# Patient Record
Sex: FEMALE | Race: WHITE | NOT HISPANIC OR LATINO | Employment: UNEMPLOYED | URBAN - NONMETROPOLITAN AREA
[De-identification: names, ages, dates, MRNs, and addresses within clinical notes are randomized per-mention and may not be internally consistent; named-entity substitution may affect disease eponyms.]

---

## 2018-12-06 ENCOUNTER — HISTORICAL (OUTPATIENT)
Dept: ADMINISTRATIVE | Facility: HOSPITAL | Age: 33
End: 2018-12-06

## 2020-10-07 ENCOUNTER — HISTORICAL (OUTPATIENT)
Dept: ADMINISTRATIVE | Facility: HOSPITAL | Age: 35
End: 2020-10-07

## 2022-01-04 LAB
C TRACH DNA SPEC QL NAA+PROBE: NEGATIVE
HUMAN PAPILLOMAVIRUS (HPV): NORMAL
N GONNORRH DNA PROBE W/RFLX: NEGATIVE

## 2022-03-11 LAB
AGE: 36
ALBUMIN SERPL-MCNC: 3.9 G/DL (ref 3.4–5)
ALBUMIN/GLOB SERPL: 1.4 {RATIO}
ALP SERPL-CCNC: 114 U/L (ref 50–144)
ALT SERPL-CCNC: 27 U/L (ref 1–45)
ANION GAP SERPL CALC-SCNC: 5 MMOL/L (ref 2–13)
AST SERPL-CCNC: 25 U/L (ref 14–36)
BASOPHILS # BLD AUTO: 0.02 10*3/UL (ref 0.01–0.08)
BASOPHILS NFR BLD AUTO: 0.3 % (ref 0.1–1.2)
BILIRUB SERPL-MCNC: 0.39 MG/DL (ref 0–1)
BUN SERPL-MCNC: 15 MG/DL (ref 7–20)
CALCIUM SERPL-MCNC: 8.4 MG/DL (ref 8.4–10.2)
CHLORIDE SERPL-SCNC: 113 MMOL/L (ref 94–110)
CO2 SERPL-SCNC: 22 MMOL/L (ref 21–32)
CREAT SERPL-MCNC: 0.74 MG/DL (ref 0.52–1.04)
CREAT/UREA NIT SERPL: 20.3 (ref 12–20)
DEPRECATED CALCIDIOL+CALCIFEROL SERPL-MC: 26.6 NG/ML (ref 30–100)
EOSINOPHIL # BLD AUTO: 0.08 10*3/UL (ref 0.04–0.36)
EOSINOPHIL NFR BLD AUTO: 1.2 % (ref 0.7–7)
ERYTHROCYTE [DISTWIDTH] IN BLOOD BY AUTOMATED COUNT: 14.3 % (ref 11–14.5)
GLOBULIN SER-MCNC: 2.7 G/DL (ref 2–3.9)
GLUCOSE SERPL-MCNC: 90 MG/DL (ref 70–115)
HCT VFR BLD AUTO: 37.9 % (ref 36–48)
HGB BLD-MCNC: 12.2 G/DL (ref 11.8–16)
IMM GRANULOCYTES # BLD AUTO: 0.05 10*3/UL (ref 0–0.03)
IMM GRANULOCYTES NFR BLD AUTO: 0.7 % (ref 0–0.5)
LYMPHOCYTES # BLD AUTO: 2.08 10*3/UL (ref 1.16–3.74)
LYMPHOCYTES NFR BLD AUTO: 30.1 % (ref 20–55)
MANUAL DIFF? (OHS): NORMAL
MCH RBC QN AUTO: 28.7 PG (ref 27–34)
MCHC RBC AUTO-ENTMCNC: 32.2 G/DL (ref 31–37)
MCV RBC AUTO: 89.2 FL (ref 79–99)
MONOCYTES # BLD AUTO: 0.68 10*3/UL (ref 0.24–0.36)
MONOCYTES NFR BLD AUTO: 9.9 % (ref 4.7–12.5)
NEUTROPHILS # BLD AUTO: 3.99 10*3/UL (ref 1.56–6.13)
NEUTROPHILS NFR BLD AUTO: 57.8 % (ref 37–73)
PLATELET # BLD AUTO: 312 10*3/UL (ref 140–371)
PMV BLD AUTO: 9.5 FL (ref 9.4–12.4)
POTASSIUM SERPL-SCNC: 4.1 MMOL/L (ref 3.5–5.1)
PROT SERPL-MCNC: 6.6 G/DL (ref 6.3–8.2)
RBC # BLD AUTO: 4.25 10*6/UL (ref 4–5.1)
SODIUM SERPL-SCNC: 140 MMOL/L (ref 135–145)
VIT B12 SERPL-MCNC: 353 PG/ML (ref 211–946)
WBC # SPEC AUTO: 6.9 10*3/UL (ref 4–11.5)

## 2022-03-25 LAB
CHOLEST SERPL-MCNC: 135 MG/DL (ref 0–200)
EST. AVERAGE GLUCOSE BLD GHB EST-MCNC: 90 MG/DL (ref 70–115)
HBA1C MFR BLD: 5 % (ref 4–6)
HDLC SERPL-MCNC: 38 MG/DL (ref 40–60)
LDLC SERPL CALC-MCNC: 61.5 MG/DL (ref 30–100)
TRIGL SERPL-MCNC: 130 MG/DL (ref 30–200)

## 2022-04-07 ENCOUNTER — HISTORICAL (OUTPATIENT)
Dept: ADMINISTRATIVE | Facility: HOSPITAL | Age: 37
End: 2022-04-07

## 2022-04-24 VITALS
WEIGHT: 251.31 LBS | HEIGHT: 55 IN | BODY MASS INDEX: 58.16 KG/M2 | DIASTOLIC BLOOD PRESSURE: 89 MMHG | OXYGEN SATURATION: 100 % | SYSTOLIC BLOOD PRESSURE: 120 MMHG

## 2022-05-15 ENCOUNTER — HISTORICAL (OUTPATIENT)
Dept: ADMINISTRATIVE | Facility: HOSPITAL | Age: 37
End: 2022-05-15

## 2023-01-23 ENCOUNTER — LAB VISIT (OUTPATIENT)
Dept: LAB | Facility: HOSPITAL | Age: 38
End: 2023-01-23
Attending: PSYCHIATRY & NEUROLOGY
Payer: MEDICAID

## 2023-01-23 DIAGNOSIS — Z51.81 ENCOUNTER FOR THERAPEUTIC DRUG MONITORING: Primary | ICD-10-CM

## 2023-01-23 LAB — PHENYTOIN SERPL-MCNC: 16.4 UG/ML (ref 10–20)

## 2023-01-23 PROCEDURE — 80175 DRUG SCREEN QUAN LAMOTRIGINE: CPT

## 2023-01-23 PROCEDURE — 80185 ASSAY OF PHENYTOIN TOTAL: CPT

## 2023-01-23 PROCEDURE — 36415 COLL VENOUS BLD VENIPUNCTURE: CPT

## 2023-01-24 LAB — LAMOTRIGINE SERPL-MCNC: 1.7 MCG/ML (ref 3–15)

## 2023-01-25 ENCOUNTER — DOCUMENTATION ONLY (OUTPATIENT)
Dept: ADMINISTRATIVE | Facility: HOSPITAL | Age: 38
End: 2023-01-25
Payer: MEDICAID

## 2023-02-15 ENCOUNTER — TELEPHONE (OUTPATIENT)
Dept: FAMILY MEDICINE | Facility: CLINIC | Age: 38
End: 2023-02-15
Payer: MEDICAID

## 2023-02-15 VITALS
TEMPERATURE: 98 F | HEART RATE: 65 BPM | DIASTOLIC BLOOD PRESSURE: 70 MMHG | WEIGHT: 250.88 LBS | SYSTOLIC BLOOD PRESSURE: 102 MMHG | OXYGEN SATURATION: 99 % | BODY MASS INDEX: 84.86 KG/M2

## 2023-02-15 DIAGNOSIS — R60.9 EDEMA, UNSPECIFIED TYPE: Primary | ICD-10-CM

## 2023-02-15 DIAGNOSIS — E03.9 HYPOTHYROIDISM, UNSPECIFIED TYPE: Primary | ICD-10-CM

## 2023-02-15 DIAGNOSIS — I10 HYPERTENSION, UNSPECIFIED TYPE: ICD-10-CM

## 2023-02-15 RX ORDER — SPIRONOLACTONE 50 MG/1
50 TABLET, FILM COATED ORAL DAILY
Qty: 30 TABLET | Refills: 0 | Status: SHIPPED | OUTPATIENT
Start: 2023-02-15 | End: 2023-02-16

## 2023-02-15 RX ORDER — HYDROXYZINE HYDROCHLORIDE 50 MG/1
50 TABLET, FILM COATED ORAL 4 TIMES DAILY
COMMUNITY
Start: 2022-11-15 | End: 2023-03-02 | Stop reason: SDUPTHER

## 2023-02-15 RX ORDER — LOSARTAN POTASSIUM 50 MG/1
50 TABLET ORAL DAILY
Qty: 30 TABLET | Refills: 0 | Status: SHIPPED | OUTPATIENT
Start: 2023-02-15 | End: 2023-03-16

## 2023-02-15 RX ORDER — LEVOTHYROXINE SODIUM 125 UG/1
125 TABLET ORAL EVERY MORNING
Qty: 30 TABLET | Refills: 0 | Status: SHIPPED | OUTPATIENT
Start: 2023-02-15 | End: 2023-03-16

## 2023-02-15 RX ORDER — PROCHLORPERAZINE MALEATE 10 MG
10 TABLET ORAL 2 TIMES DAILY PRN
COMMUNITY
Start: 2022-11-14

## 2023-02-15 RX ORDER — LAMOTRIGINE 100 MG/1
500 TABLET ORAL DAILY
COMMUNITY
Start: 2023-01-18 | End: 2023-07-13

## 2023-02-15 RX ORDER — PHENYTOIN SODIUM 100 MG/1
500 CAPSULE, EXTENDED RELEASE ORAL DAILY
COMMUNITY
Start: 2023-01-18

## 2023-02-15 RX ORDER — LOSARTAN POTASSIUM 50 MG/1
50 TABLET ORAL DAILY
COMMUNITY
Start: 2023-01-18 | End: 2023-02-15 | Stop reason: SDUPTHER

## 2023-02-15 RX ORDER — LEVOTHYROXINE SODIUM 125 UG/1
125 TABLET ORAL EVERY MORNING
COMMUNITY
Start: 2023-01-18 | End: 2023-02-15 | Stop reason: SDUPTHER

## 2023-02-15 RX ORDER — SPIRONOLACTONE 50 MG/1
50 TABLET, FILM COATED ORAL DAILY
COMMUNITY
Start: 2023-01-18 | End: 2023-02-15 | Stop reason: SDUPTHER

## 2023-02-15 RX ORDER — RIZATRIPTAN BENZOATE 10 MG/1
10 TABLET ORAL DAILY PRN
COMMUNITY
Start: 2022-11-14

## 2023-02-15 RX ORDER — PHENYTOIN 50 MG/1
50 TABLET, CHEWABLE ORAL NIGHTLY
COMMUNITY
Start: 2023-01-18

## 2023-02-15 RX ORDER — ACETAZOLAMIDE 250 MG/1
500 TABLET ORAL 2 TIMES DAILY
COMMUNITY
Start: 2023-01-18

## 2023-02-15 RX ORDER — SIMVASTATIN 20 MG/1
20 TABLET, FILM COATED ORAL NIGHTLY
COMMUNITY
Start: 2022-02-11 | End: 2023-03-02 | Stop reason: ALTCHOICE

## 2023-02-15 RX ORDER — LURASIDONE HYDROCHLORIDE 40 MG/1
40 TABLET, FILM COATED ORAL NIGHTLY
COMMUNITY
Start: 2023-01-18 | End: 2023-03-02 | Stop reason: SDUPTHER

## 2023-02-15 RX ORDER — SERTRALINE HYDROCHLORIDE 100 MG/1
150 TABLET, FILM COATED ORAL DAILY
COMMUNITY
Start: 2023-01-18 | End: 2023-03-02 | Stop reason: SDUPTHER

## 2023-02-16 ENCOUNTER — OFFICE VISIT (OUTPATIENT)
Dept: FAMILY MEDICINE | Facility: CLINIC | Age: 38
End: 2023-02-16
Payer: MEDICAID

## 2023-02-16 VITALS
TEMPERATURE: 99 F | SYSTOLIC BLOOD PRESSURE: 120 MMHG | HEIGHT: 58 IN | BODY MASS INDEX: 54.6 KG/M2 | WEIGHT: 260.13 LBS | OXYGEN SATURATION: 98 % | HEART RATE: 70 BPM | DIASTOLIC BLOOD PRESSURE: 70 MMHG

## 2023-02-16 DIAGNOSIS — E03.9 HYPOTHYROIDISM, UNSPECIFIED TYPE: ICD-10-CM

## 2023-02-16 DIAGNOSIS — G43.909 MIGRAINE WITHOUT STATUS MIGRAINOSUS, NOT INTRACTABLE, UNSPECIFIED MIGRAINE TYPE: Primary | ICD-10-CM

## 2023-02-16 DIAGNOSIS — K21.9 GASTROESOPHAGEAL REFLUX DISEASE, UNSPECIFIED WHETHER ESOPHAGITIS PRESENT: ICD-10-CM

## 2023-02-16 DIAGNOSIS — E78.5 HYPERLIPIDEMIA, UNSPECIFIED HYPERLIPIDEMIA TYPE: ICD-10-CM

## 2023-02-16 DIAGNOSIS — G40.909 NONINTRACTABLE EPILEPSY WITHOUT STATUS EPILEPTICUS, UNSPECIFIED EPILEPSY TYPE: ICD-10-CM

## 2023-02-16 DIAGNOSIS — I10 PRIMARY HYPERTENSION: ICD-10-CM

## 2023-02-16 DIAGNOSIS — E66.01 MORBID OBESITY: ICD-10-CM

## 2023-02-16 PROBLEM — R19.5 LOOSE STOOLS: Status: ACTIVE | Noted: 2023-02-16

## 2023-02-16 PROBLEM — F41.1 GENERALIZED ANXIETY DISORDER: Status: ACTIVE | Noted: 2023-02-16

## 2023-02-16 PROBLEM — F31.9 BIPOLAR I DISORDER: Status: ACTIVE | Noted: 2023-02-16

## 2023-02-16 PROBLEM — K92.1 HEMATOCHEZIA: Status: ACTIVE | Noted: 2023-02-16

## 2023-02-16 PROBLEM — F25.9 SCHIZOAFFECTIVE DISORDER: Status: ACTIVE | Noted: 2023-02-16

## 2023-02-16 PROCEDURE — 3074F SYST BP LT 130 MM HG: CPT | Mod: CPTII,,, | Performed by: NURSE PRACTITIONER

## 2023-02-16 PROCEDURE — 99214 PR OFFICE/OUTPT VISIT, EST, LEVL IV, 30-39 MIN: ICD-10-PCS | Mod: ,,, | Performed by: NURSE PRACTITIONER

## 2023-02-16 PROCEDURE — 1160F RVW MEDS BY RX/DR IN RCRD: CPT | Mod: CPTII,,, | Performed by: NURSE PRACTITIONER

## 2023-02-16 PROCEDURE — 4010F PR ACE/ARB THEARPY RXD/TAKEN: ICD-10-PCS | Mod: CPTII,,, | Performed by: NURSE PRACTITIONER

## 2023-02-16 PROCEDURE — 3008F BODY MASS INDEX DOCD: CPT | Mod: CPTII,,, | Performed by: NURSE PRACTITIONER

## 2023-02-16 PROCEDURE — 3008F PR BODY MASS INDEX (BMI) DOCUMENTED: ICD-10-PCS | Mod: CPTII,,, | Performed by: NURSE PRACTITIONER

## 2023-02-16 PROCEDURE — 1160F PR REVIEW ALL MEDS BY PRESCRIBER/CLIN PHARMACIST DOCUMENTED: ICD-10-PCS | Mod: CPTII,,, | Performed by: NURSE PRACTITIONER

## 2023-02-16 PROCEDURE — 3074F PR MOST RECENT SYSTOLIC BLOOD PRESSURE < 130 MM HG: ICD-10-PCS | Mod: CPTII,,, | Performed by: NURSE PRACTITIONER

## 2023-02-16 PROCEDURE — 1159F MED LIST DOCD IN RCRD: CPT | Mod: CPTII,,, | Performed by: NURSE PRACTITIONER

## 2023-02-16 PROCEDURE — 3078F PR MOST RECENT DIASTOLIC BLOOD PRESSURE < 80 MM HG: ICD-10-PCS | Mod: CPTII,,, | Performed by: NURSE PRACTITIONER

## 2023-02-16 PROCEDURE — 4010F ACE/ARB THERAPY RXD/TAKEN: CPT | Mod: CPTII,,, | Performed by: NURSE PRACTITIONER

## 2023-02-16 PROCEDURE — 3078F DIAST BP <80 MM HG: CPT | Mod: CPTII,,, | Performed by: NURSE PRACTITIONER

## 2023-02-16 PROCEDURE — 1159F PR MEDICATION LIST DOCUMENTED IN MEDICAL RECORD: ICD-10-PCS | Mod: CPTII,,, | Performed by: NURSE PRACTITIONER

## 2023-02-16 PROCEDURE — 99214 OFFICE O/P EST MOD 30 MIN: CPT | Mod: ,,, | Performed by: NURSE PRACTITIONER

## 2023-02-16 RX ORDER — LEVONORGESTREL 52 MG/1
1 INTRAUTERINE DEVICE INTRAUTERINE ONCE
COMMUNITY
Start: 2022-09-13 | End: 2023-02-16 | Stop reason: SDUPTHER

## 2023-02-16 NOTE — PROGRESS NOTES
Patient ID: [id number]     Chief Complaint: Hypertension, Hypothyroidism, and Anxiety (Follow up)      HPI:     Ebony Richard is a 37 y.o. female here today for a routine visit.  At her last office visit on 11/22/2022 she reported that the only antihypertensive she was taking was losartan.  She was encouraged to start this medication in notify the office for any elevations over 120/80.  Today she states that she is still taking the losartan and that she is also taking spironolactone.  She reports having seen her neurologist since her last visit in no medication changes were made though he did order some blood work.  She will follow up with him again soon.  She has no complaints today.      Past Medical History:  has a past medical history of Anxiety, Bipolar disorder, Epilepsy, unspecified, not intractable, with status epilepticus, GERD (gastroesophageal reflux disease), Hematochezia, Hyperlipidemia, Hypertension, Hypothyroidism, Long term use of drug, Loose stools, Migraines, Obesity, unspecified, and Schizoaffective disorder.    Surgical History:  has a past surgical history that includes Intrauterine device insertion (05/25/2022); Cholecystectomy; Tonsillectomy; and esophagesophagostomy.    Family History: family history includes Colon cancer in her maternal aunt; Diabetes type II in her father and mother; Hypertension in her father and mother.    Social History:  reports that she has quit smoking. Her smoking use included cigarettes. She has been exposed to tobacco smoke. She has never used smokeless tobacco. She reports that she does not drink alcohol and does not use drugs.    Current Outpatient Medications   Medication Instructions    acetaZOLAMIDE (DIAMOX) 250 mg, Oral, Daily    hydrOXYzine (ATARAX) 50 mg, Oral, 4 times daily    lamoTRIgine (LAMICTAL) 100 mg, Oral, Daily    LATUDA 40 mg, Oral, Nightly    levonorgestreL (MIRENA) 20 mcg/24 hours (8 yrs) 52 mg IUD 1 each, Intrauterine, Once     "levothyroxine (SYNTHROID) 125 mcg, Oral, Every morning    losartan (COZAAR) 50 mg, Oral, Daily    phenytoin (DILANTIN) 50 mg, Oral, Daily    phenytoin (DILANTIN) 100 mg, Oral, Daily    prochlorperazine (COMPAZINE) 10 mg, Oral, 2 times daily PRN    rizatriptan (MAXALT) 10 mg, Oral, Daily PRN    sertraline (ZOLOFT) 100 mg, Oral, Daily    simvastatin (ZOCOR) 20 mg, Oral, Nightly   Dosages of Dilantin and Lamictal not verified for accuracy.    Patient is allergic to codeine.     Patient Care Team:  MARCO Garcia as PCP - General (Family Medicine)  Benji Alaniz MD (Neurology)  YUAN MeyerP as Nurse Practitioner (Psychiatry)  Brenda Heart NP as Nurse Practitioner (Obstetrics and Gynecology)       Subjective:     Review of Systems    See HPI     Objective:     Visit Vitals  /70 (BP Location: Right arm, Patient Position: Sitting)   Pulse 70   Temp 98.6 °F (37 °C) (Temporal)   Ht 4' 10" (1.473 m)   Wt 118 kg (260 lb 2.3 oz)   LMP 02/02/2023 (Approximate)   SpO2 98%   BMI 54.37 kg/m²       Physical Exam  Constitutional:       Appearance: She is obese.   HENT:      Head: Normocephalic and atraumatic.      Mouth/Throat:      Mouth: Mucous membranes are moist.   Cardiovascular:      Rate and Rhythm: Normal rate and regular rhythm.   Pulmonary:      Effort: Pulmonary effort is normal.      Breath sounds: Normal breath sounds.   Musculoskeletal:      Cervical back: No tenderness.   Lymphadenopathy:      Cervical: No cervical adenopathy.   Skin:     General: Skin is warm and dry.   Neurological:      Mental Status: She is alert and oriented to person, place, and time. Mental status is at baseline.   Psychiatric:         Mood and Affect: Mood normal.         Behavior: Behavior normal.         Thought Content: Thought content normal.         Judgment: Judgment normal.       Assessment:       ICD-10-CM ICD-9-CM   1. Migraine without status migrainosus, not intractable, unspecified " migraine type  G43.909 346.90   2. Nonintractable epilepsy without status epilepticus, unspecified epilepsy type  G40.909 345.90   3. Hyperlipidemia, unspecified hyperlipidemia type  E78.5 272.4   4. Primary hypertension  I10 401.9   5. Hypothyroidism, unspecified type  E03.9 244.9   6. Morbid obesity  E66.01 278.01   7. Gastroesophageal reflux disease, unspecified whether esophagitis present  K21.9 530.81        Plan:     Continue current medications without change   Encouraged to keep all specialist appointments      Follow up in about 3 months (around 5/16/2023) for Wellness, Labs Prior. In addition to their scheduled follow up, the patient has also been instructed to follow up on as needed basis.     Future Appointments   Date Time Provider Department Center   3/2/2023 11:30 AM Saad Combs, PMURIEL MOORE  [facility]   3/7/2023  8:00 AM Brenda Heart NP [facility]   5/16/2023  1:00 PM MARCO Garcia [facility]        MARCO Torres

## 2023-03-02 ENCOUNTER — OFFICE VISIT (OUTPATIENT)
Dept: BEHAVIORAL HEALTH | Facility: CLINIC | Age: 38
End: 2023-03-02
Payer: MEDICAID

## 2023-03-02 VITALS
HEART RATE: 60 BPM | HEIGHT: 58 IN | OXYGEN SATURATION: 99 % | SYSTOLIC BLOOD PRESSURE: 132 MMHG | WEIGHT: 260 LBS | DIASTOLIC BLOOD PRESSURE: 86 MMHG | BODY MASS INDEX: 54.57 KG/M2 | TEMPERATURE: 98 F

## 2023-03-02 DIAGNOSIS — F41.1 GENERALIZED ANXIETY DISORDER: ICD-10-CM

## 2023-03-02 DIAGNOSIS — F31.9 BIPOLAR I DISORDER: Primary | ICD-10-CM

## 2023-03-02 PROCEDURE — 1159F PR MEDICATION LIST DOCUMENTED IN MEDICAL RECORD: ICD-10-PCS | Mod: CPTII,,, | Performed by: NURSE PRACTITIONER

## 2023-03-02 PROCEDURE — 3079F PR MOST RECENT DIASTOLIC BLOOD PRESSURE 80-89 MM HG: ICD-10-PCS | Mod: CPTII,,, | Performed by: NURSE PRACTITIONER

## 2023-03-02 PROCEDURE — 1159F MED LIST DOCD IN RCRD: CPT | Mod: CPTII,,, | Performed by: NURSE PRACTITIONER

## 2023-03-02 PROCEDURE — 1160F PR REVIEW ALL MEDS BY PRESCRIBER/CLIN PHARMACIST DOCUMENTED: ICD-10-PCS | Mod: CPTII,,, | Performed by: NURSE PRACTITIONER

## 2023-03-02 PROCEDURE — 4010F PR ACE/ARB THEARPY RXD/TAKEN: ICD-10-PCS | Mod: CPTII,,, | Performed by: NURSE PRACTITIONER

## 2023-03-02 PROCEDURE — 99214 PR OFFICE/OUTPT VISIT, EST, LEVL IV, 30-39 MIN: ICD-10-PCS | Mod: ,,, | Performed by: NURSE PRACTITIONER

## 2023-03-02 PROCEDURE — 3075F SYST BP GE 130 - 139MM HG: CPT | Mod: CPTII,,, | Performed by: NURSE PRACTITIONER

## 2023-03-02 PROCEDURE — 3008F BODY MASS INDEX DOCD: CPT | Mod: CPTII,,, | Performed by: NURSE PRACTITIONER

## 2023-03-02 PROCEDURE — 4010F ACE/ARB THERAPY RXD/TAKEN: CPT | Mod: CPTII,,, | Performed by: NURSE PRACTITIONER

## 2023-03-02 PROCEDURE — 3075F PR MOST RECENT SYSTOLIC BLOOD PRESS GE 130-139MM HG: ICD-10-PCS | Mod: CPTII,,, | Performed by: NURSE PRACTITIONER

## 2023-03-02 PROCEDURE — 99214 OFFICE O/P EST MOD 30 MIN: CPT | Mod: ,,, | Performed by: NURSE PRACTITIONER

## 2023-03-02 PROCEDURE — 3079F DIAST BP 80-89 MM HG: CPT | Mod: CPTII,,, | Performed by: NURSE PRACTITIONER

## 2023-03-02 PROCEDURE — 1160F RVW MEDS BY RX/DR IN RCRD: CPT | Mod: CPTII,,, | Performed by: NURSE PRACTITIONER

## 2023-03-02 PROCEDURE — 3008F PR BODY MASS INDEX (BMI) DOCUMENTED: ICD-10-PCS | Mod: CPTII,,, | Performed by: NURSE PRACTITIONER

## 2023-03-02 RX ORDER — SERTRALINE HYDROCHLORIDE 100 MG/1
200 TABLET, FILM COATED ORAL DAILY
Qty: 60 TABLET | Refills: 1 | Status: SHIPPED | OUTPATIENT
Start: 2023-03-02 | End: 2023-03-30 | Stop reason: SDUPTHER

## 2023-03-02 RX ORDER — HYDROXYZINE HYDROCHLORIDE 50 MG/1
50 TABLET, FILM COATED ORAL 4 TIMES DAILY
Qty: 120 TABLET | Refills: 1 | Status: SHIPPED | OUTPATIENT
Start: 2023-03-02 | End: 2023-03-30 | Stop reason: SDUPTHER

## 2023-03-02 RX ORDER — LURASIDONE HYDROCHLORIDE 40 MG/1
40 TABLET, FILM COATED ORAL NIGHTLY
Qty: 30 TABLET | Refills: 2 | Status: SHIPPED | OUTPATIENT
Start: 2023-03-02 | End: 2023-03-30 | Stop reason: SDUPTHER

## 2023-03-02 NOTE — PROGRESS NOTES
"PSYCHIATRIC FOLLOW-UP VISIT NOTE    Chief Complaint   Patient presents with    Anxiety     "Recent meltdown but doing well."         History of Present Illness  37 y.o. year old White female with hx of bipolar and ELIZA seen today for follow-up appointment and medication management.  Denies any recent depression, anhedonia, low motivation or energy, appetite changes, or feelings of guilt.  Also no manic signs or symptoms, no impulsivity, no elevated mood, in no reckless behaviors.  Reports 1 panic attack since her last visit which was triggered while she was in public.  Feels her social anxiety has been higher than normal.  She is easily startled.  Is often scared to go in public because she feels overwhelmed by crowds and voices.  We discussed coping skills that can assist her in these situations.  Recommended desensitization therapy.  Also agreed to go up on her Zoloft to 200 mg daily. Patient denies SI/HI. Denies hallucinations and does not appear to be responding to internal stimuli or be internally preoccupied. No manic symptoms noted.       Objective:     Vitals:  Vitals:    03/02/23 1136   BP: 132/86   BP Location: Left arm   Patient Position: Sitting   Pulse: 60   Temp: 97.7 °F (36.5 °C)   TempSrc: Temporal   SpO2: 99%   Weight: 117.9 kg (260 lb)   Height: 4' 10" (1.473 m)       Wt Readings from Last 3 Encounters:   03/02/23 1136 117.9 kg (260 lb)   02/16/23 1353 118 kg (260 lb 2.3 oz)   11/25/22 1108 113.8 kg (250 lb 14.1 oz)         Medication:    Current Outpatient Medications:     acetaZOLAMIDE (DIAMOX) 250 MG tablet, Take 500 mg by mouth 2 (two) times daily., Disp: , Rfl:     lamoTRIgine (LAMICTAL) 100 MG tablet, Take 100 mg by mouth once daily., Disp: , Rfl:     levonorgestreL (MIRENA) 20 mcg/24 hours (8 yrs) 52 mg IUD, 1 each by Intrauterine route once., Disp: , Rfl:     levothyroxine (SYNTHROID) 125 MCG tablet, Take 1 tablet (125 mcg total) by mouth every morning., Disp: 30 tablet, Rfl: 0    losartan " "(COZAAR) 50 MG tablet, Take 1 tablet (50 mg total) by mouth once daily., Disp: 30 tablet, Rfl: 0    phenytoin (DILANTIN) 100 MG ER capsule, Take 100 mg by mouth once daily., Disp: , Rfl:     phenytoin (DILANTIN) 50 mg chewable tablet, Take 50 mg by mouth once daily., Disp: , Rfl:     rizatriptan (MAXALT) 10 MG tablet, Take 10 mg by mouth daily as needed., Disp: , Rfl:     hydrOXYzine (ATARAX) 50 MG tablet, Take 1 tablet (50 mg total) by mouth 4 (four) times daily., Disp: 120 tablet, Rfl: 1    LATUDA 40 mg Tab tablet, Take 1 tablet (40 mg total) by mouth every evening., Disp: 30 tablet, Rfl: 2    prochlorperazine (COMPAZINE) 10 MG tablet, Take 10 mg by mouth 2 (two) times daily as needed., Disp: , Rfl:     sertraline (ZOLOFT) 100 MG tablet, Take 2 tablets (200 mg total) by mouth once daily., Disp: 60 tablet, Rfl: 1       Significant Labs: - none at this time    Significant Imaging: - none at this time    Physical Exam  Vitals and nursing note reviewed.   Constitutional:       General: She is awake.      Appearance: Normal appearance.   Musculoskeletal:      Comments: Full ROM   Neurological:      Mental Status: She is alert.   Psychiatric:         Attention and Perception: Attention and perception normal. She does not perceive auditory or visual hallucinations.         Mood and Affect: Affect normal.         Speech: Speech normal.         Behavior: Behavior is cooperative.         Thought Content: Thought content does not include homicidal or suicidal ideation.         Cognition and Memory: Cognition and memory normal.        Review of Systems     Mental Status Exam:  Presentation:  - Appearance: 37 y.o. year old White female, appears stated age  - Motility: Erect when standing, Steady gait, No EPS or Tremors, No psychomotor agitation or retardation appreciated  - Behavior: anxious, cooperative, restless   Speech:  - Character/Organization: spontaneous, pressured  Emotional State:  - Mood: "anxious"   - Affect: " congruent, appropriate, and anxious  Thought:  - Process: logical, linear, organized , goal-directed  - Preoccupations: no ruminations, rituals, or phobias appreciated  - Delusions: no persecutory, paranoid, or grandiose delusions appreciated  - Perception: denies AVH, not actively responding to internal stimuli  - SI/HI: denies/denies  Sensorium & Intellect:  - Sensorium: AAOx4  - Memory: intact to recent and remote events  - Attention/Concentration: good/good  - Insight/Judgement: good/good    Gait: normal swing and stance  MSK:no rigidity appreciated    All other systems without acute issues unless noted in HPI      Assessment/Plan      ICD-10-CM ICD-9-CM    1. Bipolar I disorder  F31.9 296.7 LATUDA 40 mg Tab tablet      2. Generalized anxiety disorder  F41.1 300.02 hydrOXYzine (ATARAX) 50 MG tablet      sertraline (ZOLOFT) 100 MG tablet         Increase Zoloft to 200 mg daily    Continue other medications without change    Reviewed non-pharmacologic coping skills to decrease anxiety, such as deep breathing, journaling, exercise, recognizing triggers, meditation, healthy diet and exercise, routine sleep schedule, negative thought recognition, time for hobbies/interests, relaxation techniques, avoidance of substance abuse, limiting caffeine, benefits of counseling, and biofeedback. Patient verbalized understanding.    Encouraged smoking cessation and reinforced negative effects of continued use. Assistance with smoking cessation was offered, including medications, counseling, printed information, and referral to smoking cessation program. Encouraged patient to visit [email or web address] for further information and resources.      Follow up in about 4 weeks (around 3/30/2023) for Medication Management.    CARMELLA Thapa

## 2023-03-06 NOTE — PROGRESS NOTES
Chief Complaint: Annual exam    Chief Complaint   Patient presents with    Well Woman       HPI:   37 y.o. WF  presents for an annual gyn exam.  Pt denies being sexually active. No complaints today.      Gardasil: x3  Mirena Iud inserted 2022.    FmHx: +colon cancer in [place], negative for breast, uterine, and ovarian cancers.     Labs / Significant Studies:  LMP: 23  Frequency: q month   Cycle Length: 3-4 days   Flow: light  Intermenstrual Bleeding: No  Postcoital Bleeding: No  Dysmenorrhea: No  Dyspareunia: No  Sexually Active: No    Hx of STI: No   Last PAP: 22 WNL HPV Negative  H/o Abnormal Pap: No        Family History   Problem Relation Age of Onset    Diabetes type II Mother     Hypertension Mother     Diabetes type II Father     Hypertension Father     Colon cancer Maternal Aunt          Past Medical History:   Diagnosis Date    Anxiety     Bipolar disorder     Epilepsy, unspecified, not intractable, with status epilepticus     GERD (gastroesophageal reflux disease)     Hematochezia     Hyperlipidemia     Hypertension     Hypothyroidism     Long term use of drug     Loose stools     Migraines     Obesity, unspecified     Schizoaffective disorder      Past Surgical History:   Procedure Laterality Date    CHOLECYSTECTOMY      esophagesophagostomy      INTRAUTERINE DEVICE INSERTION  2022    TONSILLECTOMY         Current Outpatient Medications:     acetaZOLAMIDE (DIAMOX) 250 MG tablet, Take 500 mg by mouth 2 (two) times daily., Disp: , Rfl:     hydrOXYzine (ATARAX) 50 MG tablet, Take 1 tablet (50 mg total) by mouth 4 (four) times daily., Disp: 120 tablet, Rfl: 1    lamoTRIgine (LAMICTAL) 100 MG tablet, Take 100 mg by mouth once daily., Disp: , Rfl:     LATUDA 40 mg Tab tablet, Take 1 tablet (40 mg total) by mouth every evening., Disp: 30 tablet, Rfl: 2    levonorgestreL (MIRENA) 20 mcg/24 hours (8 yrs) 52 mg IUD, 1 each by Intrauterine route once., Disp: , Rfl:     levothyroxine  (SYNTHROID) 125 MCG tablet, Take 1 tablet (125 mcg total) by mouth every morning., Disp: 30 tablet, Rfl: 0    losartan (COZAAR) 50 MG tablet, Take 1 tablet (50 mg total) by mouth once daily., Disp: 30 tablet, Rfl: 0    phenytoin (DILANTIN) 100 MG ER capsule, Take 100 mg by mouth once daily., Disp: , Rfl:     phenytoin (DILANTIN) 50 mg chewable tablet, Take 50 mg by mouth once daily., Disp: , Rfl:     prochlorperazine (COMPAZINE) 10 MG tablet, Take 10 mg by mouth 2 (two) times daily as needed., Disp: , Rfl:     rizatriptan (MAXALT) 10 MG tablet, Take 10 mg by mouth daily as needed., Disp: , Rfl:     sertraline (ZOLOFT) 100 MG tablet, Take 2 tablets (200 mg total) by mouth once daily., Disp: 60 tablet, Rfl: 1    Review of patient's allergies indicates:   Allergen Reactions    Codeine Other (See Comments)     unknown       Social History     Tobacco Use    Smoking status: Former     Types: Cigarettes     Passive exposure: Past    Smokeless tobacco: Never   Substance Use Topics    Alcohol use: Never    Drug use: Never         Review of Systems   Constitutional:  Negative for appetite change, chills, fatigue, fever and unexpected weight change.   Respiratory:  Negative for cough, shortness of breath and wheezing.    Cardiovascular:  Negative for chest pain, palpitations and leg swelling.   Gastrointestinal:  Negative for abdominal pain, blood in stool, constipation, diarrhea, nausea, vomiting and reflux.   Endocrine: Negative for diabetes, hair loss, hot flashes, hyperthyroidism and hypothyroidism.   Genitourinary:  Negative for bladder incontinence, decreased libido, dysmenorrhea, dyspareunia, dysuria, flank pain, frequency, genital sores, hematuria, hot flashes, menorrhagia, menstrual problem, pelvic pain, urgency, vaginal bleeding, vaginal discharge, vaginal pain, urinary incontinence, postcoital bleeding, postmenopausal bleeding, vaginal dryness and vaginal odor.   Integumentary:  Negative for rash, acne, hair  "changes, mole/lesion, breast mass, nipple discharge, breast skin changes and breast tenderness.   Neurological:  Negative for headaches.   Psychiatric/Behavioral:  Negative for depression and sleep disturbance. The patient is not nervous/anxious.    Breast: Negative for lump, mass, mastodynia, nipple discharge, skin changes and tenderness     Physical Exam:   Vitals:    03/07/23 0821   BP: 118/74   BP Location: Right arm   Temp: 98.8 °F (37.1 °C)   Weight: 118 kg (260 lb 2.3 oz)   Height: 4' 9.87" (1.47 m)       Body mass index is 54.61 kg/m².    Physical Exam   Constitutional: She is oriented to person, place, and time. Vital signs are normal. She appears well-developed and well-nourished.   Neck: No thyroid mass present.   Cardiovascular: Normal rate, regular rhythm, normal heart sounds and normal pulses.   No murmur heard.  Pulmonary/Chest: Breath sounds normal. No respiratory distress. She has no decreased breath sounds. She has no wheezes. She has no rhonchi. She has no rales. She exhibits no retraction. Right breast exhibits no inverted nipple, no mass, no nipple discharge, no skin change and no tenderness. Left breast exhibits no inverted nipple, no mass, no nipple discharge, no skin change and no tenderness.   Abdominal: Bowel sounds are normal. She exhibits no mass. There is no abdominal tenderness. No hernia.   Genitourinary: Vagina normal, uterus normal and cervix normal. Rectal exam shows no external hemorrhoid and no tenderness. No erythema, tenderness, rectocele or cystocele in the vagina. Right adnexum displays no mass, no tenderness and no fullness. Left adnexum displays no mass, no tenderness and no fullness. Cervix exhibits no discharge and no friability. Uterus is not deviated, not enlarged, not fixed, not tender, present, not hosting fibroids and not experiencing uterine prolapse.    Genitourinary Comments: +IUD Strings noted      Musculoskeletal:      Cervical back: No edema.      Right lower " leg: No edema.      Left lower leg: No edema.   Lymphadenopathy:        Head (right side): No submandibular and no preauricular adenopathy present.        Head (left side): No submandibular and no preauricular adenopathy present.        Right: No supraclavicular adenopathy present.        Left: No supraclavicular adenopathy present.   Neurological: She is alert and oriented to person, place, and time.   Skin: Skin is warm and dry. No rash noted. No erythema. No pallor.   Psychiatric: She has a normal mood and affect. Her behavior is normal. Mood and thought content normal. Her mood appears not anxious. She does not exhibit a depressed mood. She expresses no homicidal and no suicidal ideation. She expresses no suicidal plans and no homicidal plans.        Assessment:     Patient Active Problem List   Diagnosis    Bipolar I disorder    Epilepsy    Gastroesophageal reflux disease    Generalized anxiety disorder    Hematochezia    Hyperlipidemia    Hypertension    Hypothyroidism    Loose stools    Migraine headache    Morbid obesity    Schizoaffective disorder       Health Maintenance Due   Topic Date Due    Hepatitis C Screening  Never done    HIV Screening  Never done    TETANUS VACCINE  06/22/2020    COVID-19 Vaccine (4 - Booster for Moderna series) 02/22/2022    Cervical Cancer Screening  01/04/2023     Health Maintenance Topics with due status: Not Due       Topic Last Completion Date    Hemoglobin A1c (Diabetic Prevention Screening) 03/25/2022         Plan:    Ebony was seen today for well woman.    Diagnoses and all orders for this visit:    Abnormal gynecological examination  PAP  Counseled regarding safe sex practices and prevention of STD's .  Discussed contraception options.  Advised avoidance of tobacco, alcohol, and drugs.  Discussed breast self-awareness  Seat belt  Multivitamin, Ca/Vit D  Healthy diet and exercise  RTC 1 yr    Family history of colon cancer  - Educated    - Advised pt to continue with  Colonoscopy per PCP.     Cervical cancer screening  -     IGP,Aptima HPV,Age Gdln    IUD (intrauterine device) in place     Pt is content with IUD.         Brenda Heart

## 2023-03-07 ENCOUNTER — OFFICE VISIT (OUTPATIENT)
Dept: OBSTETRICS AND GYNECOLOGY | Facility: CLINIC | Age: 38
End: 2023-03-07
Payer: MEDICAID

## 2023-03-07 VITALS
DIASTOLIC BLOOD PRESSURE: 74 MMHG | WEIGHT: 260.13 LBS | TEMPERATURE: 99 F | BODY MASS INDEX: 54.6 KG/M2 | HEIGHT: 58 IN | SYSTOLIC BLOOD PRESSURE: 118 MMHG

## 2023-03-07 DIAGNOSIS — Z12.4 CERVICAL CANCER SCREENING: ICD-10-CM

## 2023-03-07 DIAGNOSIS — Z97.5 IUD (INTRAUTERINE DEVICE) IN PLACE: ICD-10-CM

## 2023-03-07 DIAGNOSIS — Z80.0 FAMILY HISTORY OF COLON CANCER: ICD-10-CM

## 2023-03-07 DIAGNOSIS — Z01.411 ABNORMAL GYNECOLOGICAL EXAMINATION: Primary | ICD-10-CM

## 2023-03-07 PROCEDURE — 1159F MED LIST DOCD IN RCRD: CPT | Mod: CPTII,,, | Performed by: NURSE PRACTITIONER

## 2023-03-07 PROCEDURE — 3074F PR MOST RECENT SYSTOLIC BLOOD PRESSURE < 130 MM HG: ICD-10-PCS | Mod: CPTII,,, | Performed by: NURSE PRACTITIONER

## 2023-03-07 PROCEDURE — 1160F PR REVIEW ALL MEDS BY PRESCRIBER/CLIN PHARMACIST DOCUMENTED: ICD-10-PCS | Mod: CPTII,,, | Performed by: NURSE PRACTITIONER

## 2023-03-07 PROCEDURE — 3008F PR BODY MASS INDEX (BMI) DOCUMENTED: ICD-10-PCS | Mod: CPTII,,, | Performed by: NURSE PRACTITIONER

## 2023-03-07 PROCEDURE — 3078F DIAST BP <80 MM HG: CPT | Mod: CPTII,,, | Performed by: NURSE PRACTITIONER

## 2023-03-07 PROCEDURE — 3078F PR MOST RECENT DIASTOLIC BLOOD PRESSURE < 80 MM HG: ICD-10-PCS | Mod: CPTII,,, | Performed by: NURSE PRACTITIONER

## 2023-03-07 PROCEDURE — 3074F SYST BP LT 130 MM HG: CPT | Mod: CPTII,,, | Performed by: NURSE PRACTITIONER

## 2023-03-07 PROCEDURE — 99395 PREV VISIT EST AGE 18-39: CPT | Mod: ,,, | Performed by: NURSE PRACTITIONER

## 2023-03-07 PROCEDURE — 4010F ACE/ARB THERAPY RXD/TAKEN: CPT | Mod: CPTII,,, | Performed by: NURSE PRACTITIONER

## 2023-03-07 PROCEDURE — 3008F BODY MASS INDEX DOCD: CPT | Mod: CPTII,,, | Performed by: NURSE PRACTITIONER

## 2023-03-07 PROCEDURE — 1160F RVW MEDS BY RX/DR IN RCRD: CPT | Mod: CPTII,,, | Performed by: NURSE PRACTITIONER

## 2023-03-07 PROCEDURE — 4010F PR ACE/ARB THEARPY RXD/TAKEN: ICD-10-PCS | Mod: CPTII,,, | Performed by: NURSE PRACTITIONER

## 2023-03-07 PROCEDURE — 99395 PR PREVENTIVE VISIT,EST,18-39: ICD-10-PCS | Mod: ,,, | Performed by: NURSE PRACTITIONER

## 2023-03-07 PROCEDURE — 1159F PR MEDICATION LIST DOCUMENTED IN MEDICAL RECORD: ICD-10-PCS | Mod: CPTII,,, | Performed by: NURSE PRACTITIONER

## 2023-03-10 LAB
AGE GDLN ACOG TESTING: NORMAL
CYTOLOGIST CVX/VAG CYTO: NORMAL
CYTOLOGY CVX/VAG DOC CYTO: NORMAL
CYTOLOGY CVX/VAG DOC THIN PREP: NORMAL
DX ICD CODE: NORMAL
HPV I/H RISK 4 DNA CVX QL PROBE+SIG AMP: NEGATIVE
LC HPV GENOTYPE REFLEX: NORMAL
Lab: NORMAL
OTHER STN SPEC: NORMAL
PATHOLOGIST CVX/VAG CYTO: NORMAL
STAT OF ADQ CVX/VAG CYTO-IMP: NORMAL

## 2023-03-16 DIAGNOSIS — I10 HYPERTENSION, UNSPECIFIED TYPE: ICD-10-CM

## 2023-03-16 DIAGNOSIS — E03.9 HYPOTHYROIDISM, UNSPECIFIED TYPE: ICD-10-CM

## 2023-03-16 RX ORDER — LEVOTHYROXINE SODIUM 125 UG/1
TABLET ORAL
Qty: 30 TABLET | Refills: 0 | Status: SHIPPED | OUTPATIENT
Start: 2023-03-16 | End: 2023-04-18

## 2023-03-16 RX ORDER — LOSARTAN POTASSIUM 50 MG/1
TABLET ORAL
Qty: 30 TABLET | Refills: 0 | Status: SHIPPED | OUTPATIENT
Start: 2023-03-16 | End: 2023-04-18

## 2023-03-30 ENCOUNTER — OFFICE VISIT (OUTPATIENT)
Dept: BEHAVIORAL HEALTH | Facility: CLINIC | Age: 38
End: 2023-03-30
Payer: MEDICAID

## 2023-03-30 VITALS
TEMPERATURE: 97 F | SYSTOLIC BLOOD PRESSURE: 122 MMHG | BODY MASS INDEX: 57.39 KG/M2 | OXYGEN SATURATION: 96 % | HEART RATE: 100 BPM | WEIGHT: 266 LBS | HEIGHT: 57 IN | DIASTOLIC BLOOD PRESSURE: 84 MMHG

## 2023-03-30 DIAGNOSIS — F31.9 BIPOLAR I DISORDER: Primary | ICD-10-CM

## 2023-03-30 DIAGNOSIS — F41.1 GENERALIZED ANXIETY DISORDER: ICD-10-CM

## 2023-03-30 PROCEDURE — 1159F PR MEDICATION LIST DOCUMENTED IN MEDICAL RECORD: ICD-10-PCS | Mod: CPTII,,, | Performed by: NURSE PRACTITIONER

## 2023-03-30 PROCEDURE — 4010F PR ACE/ARB THEARPY RXD/TAKEN: ICD-10-PCS | Mod: CPTII,,, | Performed by: NURSE PRACTITIONER

## 2023-03-30 PROCEDURE — 3008F BODY MASS INDEX DOCD: CPT | Mod: CPTII,,, | Performed by: NURSE PRACTITIONER

## 2023-03-30 PROCEDURE — 99214 OFFICE O/P EST MOD 30 MIN: CPT | Mod: ,,, | Performed by: NURSE PRACTITIONER

## 2023-03-30 PROCEDURE — 3008F PR BODY MASS INDEX (BMI) DOCUMENTED: ICD-10-PCS | Mod: CPTII,,, | Performed by: NURSE PRACTITIONER

## 2023-03-30 PROCEDURE — 99214 PR OFFICE/OUTPT VISIT, EST, LEVL IV, 30-39 MIN: ICD-10-PCS | Mod: ,,, | Performed by: NURSE PRACTITIONER

## 2023-03-30 PROCEDURE — 4010F ACE/ARB THERAPY RXD/TAKEN: CPT | Mod: CPTII,,, | Performed by: NURSE PRACTITIONER

## 2023-03-30 PROCEDURE — 3079F PR MOST RECENT DIASTOLIC BLOOD PRESSURE 80-89 MM HG: ICD-10-PCS | Mod: CPTII,,, | Performed by: NURSE PRACTITIONER

## 2023-03-30 PROCEDURE — 3074F PR MOST RECENT SYSTOLIC BLOOD PRESSURE < 130 MM HG: ICD-10-PCS | Mod: CPTII,,, | Performed by: NURSE PRACTITIONER

## 2023-03-30 PROCEDURE — 1159F MED LIST DOCD IN RCRD: CPT | Mod: CPTII,,, | Performed by: NURSE PRACTITIONER

## 2023-03-30 PROCEDURE — 3079F DIAST BP 80-89 MM HG: CPT | Mod: CPTII,,, | Performed by: NURSE PRACTITIONER

## 2023-03-30 PROCEDURE — 3074F SYST BP LT 130 MM HG: CPT | Mod: CPTII,,, | Performed by: NURSE PRACTITIONER

## 2023-03-30 PROCEDURE — 1160F RVW MEDS BY RX/DR IN RCRD: CPT | Mod: CPTII,,, | Performed by: NURSE PRACTITIONER

## 2023-03-30 PROCEDURE — 1160F PR REVIEW ALL MEDS BY PRESCRIBER/CLIN PHARMACIST DOCUMENTED: ICD-10-PCS | Mod: CPTII,,, | Performed by: NURSE PRACTITIONER

## 2023-03-30 RX ORDER — LURASIDONE HYDROCHLORIDE 40 MG/1
40 TABLET, FILM COATED ORAL NIGHTLY
Qty: 30 TABLET | Refills: 1 | Status: SHIPPED | OUTPATIENT
Start: 2023-03-30 | End: 2023-05-18 | Stop reason: SDUPTHER

## 2023-03-30 RX ORDER — SERTRALINE HYDROCHLORIDE 100 MG/1
200 TABLET, FILM COATED ORAL DAILY
Qty: 60 TABLET | Refills: 1 | Status: SHIPPED | OUTPATIENT
Start: 2023-03-30 | End: 2023-05-18 | Stop reason: SDUPTHER

## 2023-03-30 RX ORDER — HYDROXYZINE HYDROCHLORIDE 50 MG/1
50 TABLET, FILM COATED ORAL 4 TIMES DAILY PRN
Qty: 120 TABLET | Refills: 1 | Status: SHIPPED | OUTPATIENT
Start: 2023-03-30 | End: 2023-05-18 | Stop reason: SDUPTHER

## 2023-03-30 NOTE — PROGRESS NOTES
"PSYCHIATRIC FOLLOW-UP VISIT NOTE    Chief Complaint   Patient presents with    Mood     "I have no complaints, noticed reyna been gaining weight."         History of Present Illness  37 y.o. year old White female with hx of bipolar and ELIZA seen today for follow-up appointment and medication management.  Reports that she is noticed some improvement since increasing dose of Zoloft at last visit.  She is been less anxious and she does appear much more at ease during today's visit.  States she was also able to drive by herself to a dollar store near her house.  Patient has gone many months without driving at all.  Once she arrived at the store she was able to obtain her items and checkout without suffering a panic attack.  She did experience some heightened anxiety but was able to stay resilient and finishing approaches.  Denies any side effects following recent medication increase.  Describes her sleep pattern has varied but her dog often awakens during the night.  She is continuing to work on exposing herself to new situations to become desensitized to her heightened anxiety.  Currently using apps on her phone with good efficacy following 2 weeks of use.  Denies any recent depression. Patient denies SI/HI. Denies hallucinations and does not appear to be responding to internal stimuli or be internally preoccupied. No manic symptoms noted. Tolerating SGA therapy without serious side effects. No NMS s/s. No EPS or TD symptoms noted. AIMS=0.        Objective:     Vitals:  Vitals:    03/30/23 1431   BP: 122/84   BP Location: Left arm   Patient Position: Sitting   Pulse: 100   Temp: 97 °F (36.1 °C)   TempSrc: Temporal   SpO2: 96%   Weight: 120.7 kg (266 lb)   Height: 4' 9" (1.448 m)       Wt Readings from Last 3 Encounters:   03/30/23 1431 120.7 kg (266 lb)   03/07/23 0821 118 kg (260 lb 2.3 oz)   03/02/23 1136 117.9 kg (260 lb)         Medication:    Current Outpatient Medications:     acetaZOLAMIDE (DIAMOX) 250 MG tablet, " Take 500 mg by mouth 2 (two) times daily., Disp: , Rfl:     lamoTRIgine (LAMICTAL) 100 MG tablet, Take 500 mg by mouth once daily. 200mg po am-300mg po hs, Disp: , Rfl:     levonorgestreL (MIRENA) 20 mcg/24 hours (8 yrs) 52 mg IUD, 1 each by Intrauterine route once., Disp: , Rfl:     levothyroxine (SYNTHROID) 125 MCG tablet, TAKE 1 TABLET EVERY MORNING ON AN EMPTY STOMACH FOR THYROID, Disp: 30 tablet, Rfl: 0    losartan (COZAAR) 50 MG tablet, TAKE 1 TABLET DAILY FOR BLOOD PRESSURE, Disp: 30 tablet, Rfl: 0    phenytoin (DILANTIN) 100 MG ER capsule, Take 500 mg by mouth once daily. 300mg po am-200mg po hs c 50mg for total of 250mg po hs, Disp: , Rfl:     phenytoin (DILANTIN) 50 mg chewable tablet, Take 50 mg by mouth every evening., Disp: , Rfl:     prochlorperazine (COMPAZINE) 10 MG tablet, Take 10 mg by mouth 2 (two) times daily as needed., Disp: , Rfl:     rizatriptan (MAXALT) 10 MG tablet, Take 10 mg by mouth daily as needed., Disp: , Rfl:     hydrOXYzine (ATARAX) 50 MG tablet, Take 1 tablet (50 mg total) by mouth 4 (four) times daily as needed for Itching., Disp: 120 tablet, Rfl: 1    LATUDA 40 mg Tab tablet, Take 1 tablet (40 mg total) by mouth every evening., Disp: 30 tablet, Rfl: 1    sertraline (ZOLOFT) 100 MG tablet, Take 2 tablets (200 mg total) by mouth once daily., Disp: 60 tablet, Rfl: 1       Significant Labs: - none at this time    Significant Imaging: - none at this time    Physical Exam  Vitals and nursing note reviewed.   Constitutional:       General: She is awake.      Appearance: Normal appearance.   Musculoskeletal:      Comments: Full ROM   Neurological:      Mental Status: She is alert.   Psychiatric:         Attention and Perception: Attention and perception normal. She does not perceive auditory or visual hallucinations.         Mood and Affect: Affect normal.         Speech: Speech normal.         Behavior: Behavior is cooperative.         Thought Content: Thought content does not include  "homicidal or suicidal ideation.         Cognition and Memory: Cognition and memory normal.        Review of Systems     Mental Status Exam:  Presentation:  - Appearance: 37 y.o. year old White female, appears stated age, appears Casually dressed and Well groomed  - Motility: Erect when standing, Steady gait, No EPS or Tremors, No psychomotor agitation or retardation appreciated  - Behavior: calm, cooperative, good eye contact  Speech:  - Character/Organization: spontaneous, fluent, normal volume, normal rate, normal rhythm  Emotional State:  - Mood: "anxious but better"   - Affect: congruent and anxious  Thought:  - Process: logical, linear, organized , goal-directed  - Preoccupations: no ruminations, rituals, or phobias appreciated  - Delusions: no persecutory, paranoid, or grandiose delusions appreciated  - Perception: denies AVH, not actively responding to internal stimuli  - SI/HI: denies/denies  Sensorium & Intellect:  - Sensorium: AAOx4  - Memory: intact to recent and remote events  - Attention/Concentration: good/good  - Insight/Judgement: good/good    Gait: normal swing and stance  MSK:no rigidity appreciated    All other systems without acute issues unless noted in HPI      Assessment/Plan      ICD-10-CM ICD-9-CM    1. Bipolar I disorder  F31.9 296.7 LATUDA 40 mg Tab tablet      2. Generalized anxiety disorder  F41.1 300.02 sertraline (ZOLOFT) 100 MG tablet      hydrOXYzine (ATARAX) 50 MG tablet         Continue current medications without change    Encouraged resuming helathy diet and lifestyle changes as tolerated.     Reviewed non-pharmacologic coping skills to decrease anxiety, such as deep breathing, journaling, exercise, recognizing triggers, meditation, healthy diet and exercise, routine sleep schedule, negative thought recognition, time for hobbies/interests, relaxation techniques, avoidance of substance abuse, limiting caffeine, benefits of counseling, and biofeedback. Patient verbalized " understanding.    Potential side effects and risks vs benefits of current treatment plan reviewed with patient. Applicable black box warnings reviewed. Encouraged patient not to alter dosages or abruptly discontinue medications without contacting prescriber first, due to risk of worsening symptoms and decompensation of mental status. Warned of risks associated with herbal remedies and supplements while taking psychotropic medications and of the need to consult prescriber prior to adding any of these to current regimen. Patient should abstain from abuse of alcohol, prescription medications, and illicit drugs. Reviewed when to contact clinic and/or seek emergent care, such as but not limited to, onset/worsening SI/HI, hallucinations, delusions, manic symptoms. Pt verbalized understanding and agreement of these warnings/recommendations and verbally consented to treatment plan.      Follow up in about 2 months (around 5/30/2023) for Medication Management.    Saad Combs, PMMOHITP

## 2023-04-18 DIAGNOSIS — E03.9 HYPOTHYROIDISM, UNSPECIFIED TYPE: ICD-10-CM

## 2023-04-18 DIAGNOSIS — I10 HYPERTENSION, UNSPECIFIED TYPE: ICD-10-CM

## 2023-04-18 RX ORDER — LOSARTAN POTASSIUM 50 MG/1
TABLET ORAL
Qty: 30 TABLET | Refills: 0 | Status: SHIPPED | OUTPATIENT
Start: 2023-04-18 | End: 2023-05-01

## 2023-04-18 RX ORDER — LEVOTHYROXINE SODIUM 125 UG/1
TABLET ORAL
Qty: 30 TABLET | Refills: 0 | Status: SHIPPED | OUTPATIENT
Start: 2023-04-18 | End: 2023-05-01

## 2023-05-01 DIAGNOSIS — I10 HYPERTENSION, UNSPECIFIED TYPE: ICD-10-CM

## 2023-05-01 DIAGNOSIS — E03.9 HYPOTHYROIDISM, UNSPECIFIED TYPE: ICD-10-CM

## 2023-05-01 RX ORDER — LEVOTHYROXINE SODIUM 125 UG/1
TABLET ORAL
Qty: 30 TABLET | Refills: 0 | Status: SHIPPED | OUTPATIENT
Start: 2023-05-01 | End: 2023-06-13

## 2023-05-01 RX ORDER — LOSARTAN POTASSIUM 50 MG/1
TABLET ORAL
Qty: 30 TABLET | Refills: 0 | Status: SHIPPED | OUTPATIENT
Start: 2023-05-01 | End: 2023-05-16 | Stop reason: SDUPTHER

## 2023-05-16 ENCOUNTER — OFFICE VISIT (OUTPATIENT)
Dept: FAMILY MEDICINE | Facility: CLINIC | Age: 38
End: 2023-05-16
Payer: MEDICAID

## 2023-05-16 VITALS
BODY MASS INDEX: 57.86 KG/M2 | WEIGHT: 268.19 LBS | SYSTOLIC BLOOD PRESSURE: 134 MMHG | HEART RATE: 80 BPM | OXYGEN SATURATION: 98 % | TEMPERATURE: 98 F | HEIGHT: 57 IN | DIASTOLIC BLOOD PRESSURE: 96 MMHG

## 2023-05-16 DIAGNOSIS — G93.2 BENIGN INTRACRANIAL HYPERTENSION: ICD-10-CM

## 2023-05-16 DIAGNOSIS — E66.01 MORBID OBESITY: ICD-10-CM

## 2023-05-16 DIAGNOSIS — I10 HYPERTENSION, UNSPECIFIED TYPE: ICD-10-CM

## 2023-05-16 DIAGNOSIS — G43.909 MIGRAINE WITHOUT STATUS MIGRAINOSUS, NOT INTRACTABLE, UNSPECIFIED MIGRAINE TYPE: Primary | ICD-10-CM

## 2023-05-16 DIAGNOSIS — G47.33 OSA (OBSTRUCTIVE SLEEP APNEA): ICD-10-CM

## 2023-05-16 DIAGNOSIS — I10 PRIMARY HYPERTENSION: ICD-10-CM

## 2023-05-16 PROBLEM — K92.1 HEMATOCHEZIA: Status: RESOLVED | Noted: 2023-02-16 | Resolved: 2023-05-16

## 2023-05-16 PROBLEM — R19.5 LOOSE STOOLS: Status: RESOLVED | Noted: 2023-02-16 | Resolved: 2023-05-16

## 2023-05-16 PROCEDURE — 99214 PR OFFICE/OUTPT VISIT, EST, LEVL IV, 30-39 MIN: ICD-10-PCS | Mod: ,,, | Performed by: NURSE PRACTITIONER

## 2023-05-16 PROCEDURE — 3075F PR MOST RECENT SYSTOLIC BLOOD PRESS GE 130-139MM HG: ICD-10-PCS | Mod: CPTII,,, | Performed by: NURSE PRACTITIONER

## 2023-05-16 PROCEDURE — 1160F PR REVIEW ALL MEDS BY PRESCRIBER/CLIN PHARMACIST DOCUMENTED: ICD-10-PCS | Mod: CPTII,,, | Performed by: NURSE PRACTITIONER

## 2023-05-16 PROCEDURE — 3075F SYST BP GE 130 - 139MM HG: CPT | Mod: CPTII,,, | Performed by: NURSE PRACTITIONER

## 2023-05-16 PROCEDURE — 3008F BODY MASS INDEX DOCD: CPT | Mod: CPTII,,, | Performed by: NURSE PRACTITIONER

## 2023-05-16 PROCEDURE — 1159F MED LIST DOCD IN RCRD: CPT | Mod: CPTII,,, | Performed by: NURSE PRACTITIONER

## 2023-05-16 PROCEDURE — 99214 OFFICE O/P EST MOD 30 MIN: CPT | Mod: ,,, | Performed by: NURSE PRACTITIONER

## 2023-05-16 PROCEDURE — 4010F PR ACE/ARB THEARPY RXD/TAKEN: ICD-10-PCS | Mod: CPTII,,, | Performed by: NURSE PRACTITIONER

## 2023-05-16 PROCEDURE — 1160F RVW MEDS BY RX/DR IN RCRD: CPT | Mod: CPTII,,, | Performed by: NURSE PRACTITIONER

## 2023-05-16 PROCEDURE — 4010F ACE/ARB THERAPY RXD/TAKEN: CPT | Mod: CPTII,,, | Performed by: NURSE PRACTITIONER

## 2023-05-16 PROCEDURE — 3080F PR MOST RECENT DIASTOLIC BLOOD PRESSURE >= 90 MM HG: ICD-10-PCS | Mod: CPTII,,, | Performed by: NURSE PRACTITIONER

## 2023-05-16 PROCEDURE — 1159F PR MEDICATION LIST DOCUMENTED IN MEDICAL RECORD: ICD-10-PCS | Mod: CPTII,,, | Performed by: NURSE PRACTITIONER

## 2023-05-16 PROCEDURE — 3008F PR BODY MASS INDEX (BMI) DOCUMENTED: ICD-10-PCS | Mod: CPTII,,, | Performed by: NURSE PRACTITIONER

## 2023-05-16 PROCEDURE — 3080F DIAST BP >= 90 MM HG: CPT | Mod: CPTII,,, | Performed by: NURSE PRACTITIONER

## 2023-05-16 RX ORDER — LOSARTAN POTASSIUM 100 MG/1
100 TABLET ORAL DAILY
Qty: 90 TABLET | Refills: 0 | Status: SHIPPED | OUTPATIENT
Start: 2023-05-16 | End: 2023-08-17 | Stop reason: SDUPTHER

## 2023-05-16 NOTE — PROGRESS NOTES
Patient ID: [id number]     Chief Complaint: Hypertension, Migraine, and Anxiety (Ringing in right ear. )      HPI:     Ebony Richard is a 37 y.o. female here today for her wellness visit to discuss lab results.  No labs were collected prior to this appointment and she has high blood pressure today. She's been more anxious over the last 3 weeks because her mental health provider has encouraged her to push herself to get out more.  She's been trying not to stay home as much.  She notes that this has only increased her level of anxiety which in turn causes her to have more seizures.  She states that her blood pressure has been running 110s/80s at home.  She believes it is elevated because she is anxious about being here.     She has a CPAP machine that is about 15 years old and broken and in need of a recall.  She has not been able to use it for an extended period of time.  She has been having more severe seizures according to her mother.  They are not occurring more frequently but are lasting longer and are more of a grand mal in nature.  Just the other day her mother found her outside.  She has not wanted to repeat her sleep study in the sleep lab and has been putting off the test and getting a new machine because of this. She is in need of a home sleep study because it is difficult for her to leave the house.  This is due in part to her anxiety as well as her inability to drive and her aging parents.    She has not seen her neurologist in over 6 months.  The appointments have been rescheduled for different reasons.         Past Medical History:  has a past medical history of Anxiety, Bipolar disorder, Epilepsy, unspecified, not intractable, with status epilepticus, GERD (gastroesophageal reflux disease), Hematochezia, Hyperlipidemia, Hypertension, Hypothyroidism, Long term use of drug, Loose stools, Migraines, Obesity, unspecified, and Schizoaffective disorder.    Surgical History:  has a past surgical history  that includes Intrauterine device insertion (05/25/2022); Cholecystectomy; Tonsillectomy; and esophagesophagostomy.    Family History: family history includes Bipolar disorder in her cousin; Colon cancer in her maternal aunt; Depression in her brother; Diabetes type II in her father and mother; Hypertension in her father and mother; No Known Problems in her maternal grandmother, maternal uncle, paternal aunt, paternal grandfather, paternal grandmother, paternal uncle, sister, and another family member; Schizophrenia in her maternal grandfather.    Social History:  reports that she has quit smoking. Her smoking use included cigarettes. She has been exposed to tobacco smoke. She has never used smokeless tobacco. She reports that she does not drink alcohol and does not use drugs.    Current Outpatient Medications   Medication Instructions    acetaZOLAMIDE (DIAMOX) 500 mg, Oral, 2 times daily    hydrOXYzine (ATARAX) 50 mg, Oral, 4 times daily PRN    lamoTRIgine (LAMICTAL) 500 mg, Oral, Daily, 200mg po am-300mg po hs    LATUDA 40 mg, Oral, Nightly    levonorgestreL (MIRENA) 20 mcg/24 hours (8 yrs) 52 mg IUD 1 each, Intrauterine, Once    levothyroxine (SYNTHROID) 125 MCG tablet TAKE 1 TABLET EVERY MORNING ON AN EMPTY STOMACH FOR THYROID    losartan (COZAAR) 100 mg, Oral, Daily, FOR BLOOD PRESSURE    phenytoin (DILANTIN) 50 mg, Oral, Nightly    phenytoin (DILANTIN) 500 mg, Oral, Daily, 300mg po am-200mg po hs c 50mg for total of 250mg po hs    prochlorperazine (COMPAZINE) 10 mg, Oral, 2 times daily PRN    rizatriptan (MAXALT) 10 mg, Oral, Daily PRN    sertraline (ZOLOFT) 200 mg, Oral, Daily       Patient is allergic to codeine.     Patient Care Team:  MARCO Garcia as PCP - General (Family Medicine)  Benji Alaniz MD (Neurology)  CARMELLA Meyer as Nurse Practitioner (Psychiatry)  Brenda Heart NP as Nurse Practitioner (Obstetrics and Gynecology)       Subjective:     Review of  "Systems    See HPI     Objective:     Visit Vitals  BP (!) 134/96 (BP Location: Left arm, Patient Position: Sitting, BP Method: X-Large (Manual))   Pulse 80   Temp 97.9 °F (36.6 °C) (Temporal)   Ht 4' 9" (1.448 m)   Wt 121.7 kg (268 lb 3.2 oz)   LMP 05/02/2023 (Approximate)   SpO2 98%   BMI 58.04 kg/m²       Physical Exam  Vitals reviewed.   Constitutional:       Appearance: She is obese.   HENT:      Mouth/Throat:      Mouth: Mucous membranes are moist.   Cardiovascular:      Rate and Rhythm: Normal rate and regular rhythm.      Heart sounds: Normal heart sounds.   Pulmonary:      Effort: Pulmonary effort is normal.      Breath sounds: Normal breath sounds.   Musculoskeletal:      Right lower leg: No edema.      Left lower leg: No edema.   Skin:     General: Skin is warm and dry.   Neurological:      Mental Status: She is alert and oriented to person, place, and time.   Psychiatric:         Attention and Perception: Attention and perception normal.         Mood and Affect: Mood is anxious.         Behavior: Behavior is cooperative.         Thought Content: Thought content normal. Thought content does not include suicidal ideation.     Labs Reviewed:     Chemistry:  Lab Results   Component Value Date     03/11/2022    K 4.1 03/11/2022    CHLORIDE 113 03/11/2022    BUN 15 03/11/2022    CREATININE 0.74 03/11/2022    GLUCOSE 90 03/11/2022    CALCIUM 8.4 03/11/2022    ALKPHOS 114 03/11/2022    LABPROT 6.6 03/11/2022    ALBUMIN 3.9 03/11/2022    AST 25 03/11/2022    ALT 27 03/11/2022    [id number] 26.60 03/11/2022        Lab Results   Component Value Date    HGBA1C 5.0 03/25/2022        Hematology:  Lab Results   Component Value Date    WBC 6.9 03/11/2022    RBC 4.25 03/11/2022    HGB 12.2 03/11/2022    HCT 37.9 03/11/2022    MCV 89.2 03/11/2022    MCH 28.7 03/11/2022    MCHC 32.2 03/11/2022    RDW 14.3 03/11/2022     03/11/2022    MPV 9.5 03/11/2022       Lipid Panel:  Lab Results   Component Value Date "    CHOL 135 03/25/2022    HDL 38 03/25/2022    TRIG 130 03/25/2022        Assessment:       ICD-10-CM ICD-9-CM   1. Migraine without status migrainosus, not intractable, unspecified migraine type  G43.909 346.90   2. Primary hypertension  I10 401.9   3. Morbid obesity  E66.01 278.01   4. Benign intracranial hypertension  G93.2 348.2   5. Hypertension, unspecified type  I10 401.9   6. RAHEL (obstructive sleep apnea)  G47.33 327.23        Plan:     Increase losartan  Home sleep study  Encouraged follow up with neurology  Encouraged counseling.     I spent a total of 38 minutes on the day of the visit.  This includes face to face time and non-face to face time preparing to see the patient (eg, review of tests), obtaining and/or reviewing separately obtained history, documenting clinical information in the electronic or other health record, independently interpreting results and communicating results to the patient/family/caregiver, or care coordinator.     Follow up in about 1 month (around 6/16/2023) for Wellness, Labs Prior, BP Check. In addition to their scheduled follow up, the patient has also been instructed to follow up on as needed basis.     Future Appointments   Date Time Provider Department Center   5/18/2023  2:00 PM Saad Combs PMURIEL [facility] Radha [facility]   6/21/2023  9:20 AM LAB, [facility] LABORATORY DRAW STATION [facility] WOLFGANG Garcia [facility]   6/29/2023  8:00 AM [facility]   3/11/2024  2:00 PM Brenda Heart NP [facility] PARVEEN Garcia OB        MARCO Torres

## 2023-05-18 ENCOUNTER — OFFICE VISIT (OUTPATIENT)
Dept: BEHAVIORAL HEALTH | Facility: CLINIC | Age: 38
End: 2023-05-18
Payer: MEDICAID

## 2023-05-18 VITALS
HEIGHT: 57 IN | OXYGEN SATURATION: 100 % | WEIGHT: 268 LBS | BODY MASS INDEX: 57.82 KG/M2 | DIASTOLIC BLOOD PRESSURE: 84 MMHG | SYSTOLIC BLOOD PRESSURE: 136 MMHG | HEART RATE: 62 BPM | TEMPERATURE: 98 F

## 2023-05-18 DIAGNOSIS — F31.9 BIPOLAR I DISORDER: Primary | ICD-10-CM

## 2023-05-18 DIAGNOSIS — F41.1 GENERALIZED ANXIETY DISORDER: ICD-10-CM

## 2023-05-18 PROCEDURE — 1160F PR REVIEW ALL MEDS BY PRESCRIBER/CLIN PHARMACIST DOCUMENTED: ICD-10-PCS | Mod: CPTII,,, | Performed by: NURSE PRACTITIONER

## 2023-05-18 PROCEDURE — 4010F PR ACE/ARB THEARPY RXD/TAKEN: ICD-10-PCS | Mod: CPTII,,, | Performed by: NURSE PRACTITIONER

## 2023-05-18 PROCEDURE — 99214 OFFICE O/P EST MOD 30 MIN: CPT | Mod: ,,, | Performed by: NURSE PRACTITIONER

## 2023-05-18 PROCEDURE — 3075F SYST BP GE 130 - 139MM HG: CPT | Mod: CPTII,,, | Performed by: NURSE PRACTITIONER

## 2023-05-18 PROCEDURE — 3008F BODY MASS INDEX DOCD: CPT | Mod: CPTII,,, | Performed by: NURSE PRACTITIONER

## 2023-05-18 PROCEDURE — 1159F PR MEDICATION LIST DOCUMENTED IN MEDICAL RECORD: ICD-10-PCS | Mod: CPTII,,, | Performed by: NURSE PRACTITIONER

## 2023-05-18 PROCEDURE — 3079F DIAST BP 80-89 MM HG: CPT | Mod: CPTII,,, | Performed by: NURSE PRACTITIONER

## 2023-05-18 PROCEDURE — 3075F PR MOST RECENT SYSTOLIC BLOOD PRESS GE 130-139MM HG: ICD-10-PCS | Mod: CPTII,,, | Performed by: NURSE PRACTITIONER

## 2023-05-18 PROCEDURE — 1159F MED LIST DOCD IN RCRD: CPT | Mod: CPTII,,, | Performed by: NURSE PRACTITIONER

## 2023-05-18 PROCEDURE — 4010F ACE/ARB THERAPY RXD/TAKEN: CPT | Mod: CPTII,,, | Performed by: NURSE PRACTITIONER

## 2023-05-18 PROCEDURE — 99214 PR OFFICE/OUTPT VISIT, EST, LEVL IV, 30-39 MIN: ICD-10-PCS | Mod: ,,, | Performed by: NURSE PRACTITIONER

## 2023-05-18 PROCEDURE — 1160F RVW MEDS BY RX/DR IN RCRD: CPT | Mod: CPTII,,, | Performed by: NURSE PRACTITIONER

## 2023-05-18 PROCEDURE — 3079F PR MOST RECENT DIASTOLIC BLOOD PRESSURE 80-89 MM HG: ICD-10-PCS | Mod: CPTII,,, | Performed by: NURSE PRACTITIONER

## 2023-05-18 PROCEDURE — 3008F PR BODY MASS INDEX (BMI) DOCUMENTED: ICD-10-PCS | Mod: CPTII,,, | Performed by: NURSE PRACTITIONER

## 2023-05-18 RX ORDER — HYDROXYZINE HYDROCHLORIDE 50 MG/1
50 TABLET, FILM COATED ORAL 4 TIMES DAILY PRN
Qty: 120 TABLET | Refills: 1 | Status: SHIPPED | OUTPATIENT
Start: 2023-05-18 | End: 2023-06-27 | Stop reason: SDUPTHER

## 2023-05-18 RX ORDER — LURASIDONE HYDROCHLORIDE 40 MG/1
40 TABLET, FILM COATED ORAL NIGHTLY
Qty: 30 TABLET | Refills: 1 | Status: SHIPPED | OUTPATIENT
Start: 2023-05-18 | End: 2023-06-27 | Stop reason: SDUPTHER

## 2023-05-18 RX ORDER — SERTRALINE HYDROCHLORIDE 100 MG/1
200 TABLET, FILM COATED ORAL DAILY
Qty: 60 TABLET | Refills: 1 | Status: SHIPPED | OUTPATIENT
Start: 2023-05-18 | End: 2023-06-27 | Stop reason: SDUPTHER

## 2023-05-18 NOTE — PROGRESS NOTES
"PSYCHIATRIC FOLLOW-UP VISIT NOTE    Chief Complaint   Patient presents with    Anxiety     "Since the last visit I have had much inc in anxiety trying to experience public."         History of Present Illness  37 y.o. year old White female with hx of bipolar disorder and ELIZA seen today for follow-up appointment and medication management.  Patient presents with her mother to today's visit.  Reports she is been doing fairly well since last visit.  Denies any depression.  No mood lability reported.  No irritability or behavioral outbursts.  Also no reported signs or symptoms of juan m or hypomania.  Feels her mood has been good most days.  She is experiencing persistent anxiety that particularly occurs when she is out in public.  States this comes from an overwhelming fear of having a seizure in public and then being the center of attention with all kind of people around me whenever I wake up.  She is tried going to the dollar store and other places near her home but often finds this overwhelming.  This leads to increased isolation despite her desire to be more interactive in public.  There has been extensive time discussing treatment options that may assist her including following up with her neurologist for better control of her seizure disorder, exposure therapy, and coping skill development.  She also expressed interest in a service animal and I spent time explaining the differences between trained service animals and emotional support animals.  We agreed to make no further medication changes today. Patient denies SI/HI. Denies hallucinations and does not appear to be responding to internal stimuli or be internally preoccupied. No manic symptoms noted. Tolerating SGA therapy without serious side effects. No NMS s/s. No EPS or TD symptoms noted. AIMS=0.          Objective:     Vitals:  Vitals:    05/18/23 1402   BP: 136/84   BP Location: Left arm   Patient Position: Sitting   Pulse: 62   Temp: 97.9 °F (36.6 °C) " "  TempSrc: Temporal   SpO2: 100%   Weight: 121.6 kg (268 lb)   Height: 4' 9" (1.448 m)       Wt Readings from Last 3 Encounters:   05/18/23 1402 121.6 kg (268 lb)   05/16/23 1314 121.7 kg (268 lb 3.2 oz)   03/30/23 1431 120.7 kg (266 lb)         Medication:    Current Outpatient Medications:     acetaZOLAMIDE (DIAMOX) 250 MG tablet, Take 500 mg by mouth 2 (two) times daily., Disp: , Rfl:     lamoTRIgine (LAMICTAL) 100 MG tablet, Take 500 mg by mouth once daily. 200mg po am-300mg po hs, Disp: , Rfl:     levonorgestreL (MIRENA) 20 mcg/24 hours (8 yrs) 52 mg IUD, 1 each by Intrauterine route once., Disp: , Rfl:     levothyroxine (SYNTHROID) 125 MCG tablet, TAKE 1 TABLET EVERY MORNING ON AN EMPTY STOMACH FOR THYROID, Disp: 30 tablet, Rfl: 0    losartan (COZAAR) 100 MG tablet, Take 1 tablet (100 mg total) by mouth once daily. FOR BLOOD PRESSURE, Disp: 90 tablet, Rfl: 0    phenytoin (DILANTIN) 100 MG ER capsule, Take 500 mg by mouth once daily. 300mg po am-200mg po hs c 50mg for total of 250mg po hs, Disp: , Rfl:     phenytoin (DILANTIN) 50 mg chewable tablet, Take 50 mg by mouth every evening., Disp: , Rfl:     hydrOXYzine (ATARAX) 50 MG tablet, Take 1 tablet (50 mg total) by mouth 4 (four) times daily as needed for Itching., Disp: 120 tablet, Rfl: 1    LATUDA 40 mg Tab tablet, Take 1 tablet (40 mg total) by mouth every evening., Disp: 30 tablet, Rfl: 1    prochlorperazine (COMPAZINE) 10 MG tablet, Take 10 mg by mouth 2 (two) times daily as needed., Disp: , Rfl:     rizatriptan (MAXALT) 10 MG tablet, Take 10 mg by mouth daily as needed., Disp: , Rfl:     sertraline (ZOLOFT) 100 MG tablet, Take 2 tablets (200 mg total) by mouth once daily., Disp: 60 tablet, Rfl: 1       Significant Labs: - none at this time    Significant Imaging: - none at this time    Physical Exam  Vitals and nursing note reviewed.   Constitutional:       General: She is awake.      Appearance: Normal appearance.   Musculoskeletal:      Comments: Full " "ROM   Neurological:      Mental Status: She is alert.   Psychiatric:         Attention and Perception: Attention and perception normal. She does not perceive auditory or visual hallucinations.         Mood and Affect: Affect normal.         Speech: Speech normal.         Behavior: Behavior is cooperative.         Thought Content: Thought content does not include homicidal or suicidal ideation.         Cognition and Memory: Cognition and memory normal.        Review of Systems     Mental Status Exam:  Presentation:  - Appearance: 37 y.o. year old White female, appears stated age, appears Casually dressed and Well groomed  - Motility: Erect when standing, Steady gait, No EPS or Tremors, No psychomotor agitation or retardation appreciated  - Behavior: anxious, cooperative, maintains eye contact  Speech:  - Character/Organization: pressured  Emotional State:  - Mood: "anxious"   - Affect: congruent and anxious  Thought:  - Process: logical, linear, organized , goal-directed  - Preoccupations: no ruminations, rituals, or phobias appreciated  - Delusions: no persecutory, paranoid, or grandiose delusions appreciated  - Perception: denies AVH, not actively responding to internal stimuli  - SI/HI: denies/denies  Sensorium & Intellect:  - Sensorium: AAOx4  - Memory: intact to recent and remote events  - Attention/Concentration: good/good  - Insight/Judgement: good/good    Gait: normal swing and stance  MSK:no rigidity appreciated    All other systems without acute issues unless noted in HPI      Assessment/Plan      ICD-10-CM ICD-9-CM    1. Bipolar I disorder  F31.9 296.7 LATUDA 40 mg Tab tablet      2. Generalized anxiety disorder  F41.1 300.02 hydrOXYzine (ATARAX) 50 MG tablet      sertraline (ZOLOFT) 100 MG tablet         Continue current medications without change    Potential side effects and risks vs benefits of current treatment plan reviewed with patient. Applicable black box warnings reviewed. Encouraged patient not " to alter dosages or abruptly discontinue medications without contacting prescriber first, due to risk of worsening symptoms and decompensation of mental status. Warned of risks associated with herbal remedies and supplements while taking psychotropic medications and of the need to consult prescriber prior to adding any of these to current regimen. Patient should abstain from abuse of alcohol, prescription medications, and illicit drugs. Reviewed when to contact clinic and/or seek emergent care, such as but not limited to, onset/worsening SI/HI, hallucinations, delusions, manic symptoms. Pt verbalized understanding and agreement of these warnings/recommendations and verbally consented to treatment plan.    Excess of 16 minutes outside of medication management spent discussing triggers for patient's anxiety, benefits of exposure therapy, indications and differences between service animals and emotional support animals, and other nonpharmacological treatment options for her anxiety.  Brief supportive therapy techniques employed.      Follow up in about 6 weeks (around 6/29/2023) for Medication Management.    Saad Combs, PMMOHITP

## 2023-06-13 DIAGNOSIS — E03.9 HYPOTHYROIDISM, UNSPECIFIED TYPE: ICD-10-CM

## 2023-06-13 RX ORDER — LEVOTHYROXINE SODIUM 125 UG/1
TABLET ORAL
Qty: 30 TABLET | Refills: 0 | Status: SHIPPED | OUTPATIENT
Start: 2023-06-13 | End: 2023-07-11

## 2023-06-27 ENCOUNTER — OFFICE VISIT (OUTPATIENT)
Dept: BEHAVIORAL HEALTH | Facility: CLINIC | Age: 38
End: 2023-06-27
Payer: MEDICAID

## 2023-06-27 VITALS
HEART RATE: 64 BPM | BODY MASS INDEX: 57.6 KG/M2 | TEMPERATURE: 99 F | OXYGEN SATURATION: 99 % | HEIGHT: 57 IN | WEIGHT: 267 LBS | DIASTOLIC BLOOD PRESSURE: 80 MMHG | SYSTOLIC BLOOD PRESSURE: 130 MMHG

## 2023-06-27 DIAGNOSIS — F41.1 GENERALIZED ANXIETY DISORDER: ICD-10-CM

## 2023-06-27 DIAGNOSIS — F31.9 BIPOLAR I DISORDER: Primary | ICD-10-CM

## 2023-06-27 PROCEDURE — 3079F DIAST BP 80-89 MM HG: CPT | Mod: CPTII,,, | Performed by: NURSE PRACTITIONER

## 2023-06-27 PROCEDURE — 99214 PR OFFICE/OUTPT VISIT, EST, LEVL IV, 30-39 MIN: ICD-10-PCS | Mod: ,,, | Performed by: NURSE PRACTITIONER

## 2023-06-27 PROCEDURE — 3008F PR BODY MASS INDEX (BMI) DOCUMENTED: ICD-10-PCS | Mod: CPTII,,, | Performed by: NURSE PRACTITIONER

## 2023-06-27 PROCEDURE — 3008F BODY MASS INDEX DOCD: CPT | Mod: CPTII,,, | Performed by: NURSE PRACTITIONER

## 2023-06-27 PROCEDURE — 3079F PR MOST RECENT DIASTOLIC BLOOD PRESSURE 80-89 MM HG: ICD-10-PCS | Mod: CPTII,,, | Performed by: NURSE PRACTITIONER

## 2023-06-27 PROCEDURE — 4010F ACE/ARB THERAPY RXD/TAKEN: CPT | Mod: CPTII,,, | Performed by: NURSE PRACTITIONER

## 2023-06-27 PROCEDURE — 1159F PR MEDICATION LIST DOCUMENTED IN MEDICAL RECORD: ICD-10-PCS | Mod: CPTII,,, | Performed by: NURSE PRACTITIONER

## 2023-06-27 PROCEDURE — 1160F PR REVIEW ALL MEDS BY PRESCRIBER/CLIN PHARMACIST DOCUMENTED: ICD-10-PCS | Mod: CPTII,,, | Performed by: NURSE PRACTITIONER

## 2023-06-27 PROCEDURE — 3075F PR MOST RECENT SYSTOLIC BLOOD PRESS GE 130-139MM HG: ICD-10-PCS | Mod: CPTII,,, | Performed by: NURSE PRACTITIONER

## 2023-06-27 PROCEDURE — 3075F SYST BP GE 130 - 139MM HG: CPT | Mod: CPTII,,, | Performed by: NURSE PRACTITIONER

## 2023-06-27 PROCEDURE — 1160F RVW MEDS BY RX/DR IN RCRD: CPT | Mod: CPTII,,, | Performed by: NURSE PRACTITIONER

## 2023-06-27 PROCEDURE — 99214 OFFICE O/P EST MOD 30 MIN: CPT | Mod: ,,, | Performed by: NURSE PRACTITIONER

## 2023-06-27 PROCEDURE — 4010F PR ACE/ARB THEARPY RXD/TAKEN: ICD-10-PCS | Mod: CPTII,,, | Performed by: NURSE PRACTITIONER

## 2023-06-27 PROCEDURE — 1159F MED LIST DOCD IN RCRD: CPT | Mod: CPTII,,, | Performed by: NURSE PRACTITIONER

## 2023-06-27 RX ORDER — SERTRALINE HYDROCHLORIDE 100 MG/1
200 TABLET, FILM COATED ORAL DAILY
Qty: 60 TABLET | Refills: 1 | Status: SHIPPED | OUTPATIENT
Start: 2023-06-27 | End: 2023-08-28 | Stop reason: SDUPTHER

## 2023-06-27 RX ORDER — LURASIDONE HYDROCHLORIDE 40 MG/1
40 TABLET, FILM COATED ORAL NIGHTLY
Qty: 30 TABLET | Refills: 1 | Status: SHIPPED | OUTPATIENT
Start: 2023-06-27 | End: 2023-08-28 | Stop reason: SDUPTHER

## 2023-06-27 RX ORDER — HYDROXYZINE HYDROCHLORIDE 50 MG/1
50 TABLET, FILM COATED ORAL 4 TIMES DAILY PRN
Qty: 120 TABLET | Refills: 1 | Status: SHIPPED | OUTPATIENT
Start: 2023-06-27 | End: 2023-08-28 | Stop reason: SDUPTHER

## 2023-06-27 NOTE — PROGRESS NOTES
"PSYCHIATRIC FOLLOW-UP VISIT NOTE    Chief Complaint   Patient presents with    Anxiety     "I have limited my outgoing to roughly once a week now. My anxiety has lessened significantly."         History of Present Illness  37 y.o. year old White female with hx of bipolar and ELIZA seen today for follow-up appointment and medication management.  Patient reports she is been doing well since last visit.  Has been more selective on her outings alone to local business she is coping with the social anxiety much more effectively at this time.  No recent panic attacks.  She also recently got some new shoes and workout clothes and has been more active around the house.  Has been helping her father accomplish chores and tasks as well.  She denies any recent depression.  Feels mood has been stable and denies any irritability or mood lability.  No side effects from current medication regimen.  She has continued working on desensitization to social situations and feels she will be ready to increase the frequency of her outings in the next 1-2 weeks.  We discussed coping skills and strategies that can make this more bearable for her and she verbalized understanding and agreement.Patient denies SI/HI. Denies hallucinations and does not appear to be responding to internal stimuli or be internally preoccupied. No manic symptoms noted. Tolerating SGA therapy without serious side effects. No NMS s/s. No EPS or TD symptoms noted. AIMS=0.        Objective:     Vitals:  Vitals:    06/27/23 1504   BP: 130/80   BP Location: Left arm   Patient Position: Sitting   Pulse: 64   Temp: 98.8 °F (37.1 °C)   TempSrc: Temporal   SpO2: 99%   Weight: 121.1 kg (267 lb)   Height: 4' 9" (1.448 m)       Wt Readings from Last 3 Encounters:   06/27/23 1504 121.1 kg (267 lb)   05/18/23 1402 121.6 kg (268 lb)   05/16/23 1314 121.7 kg (268 lb 3.2 oz)         Medication:    Current Outpatient Medications:     acetaZOLAMIDE (DIAMOX) 250 MG tablet, Take 500 mg by " mouth 2 (two) times daily., Disp: , Rfl:     lamoTRIgine (LAMICTAL) 100 MG tablet, Take 500 mg by mouth once daily. 200mg po am-300mg po hs, Disp: , Rfl:     levonorgestreL (MIRENA) 20 mcg/24 hours (8 yrs) 52 mg IUD, 1 each by Intrauterine route once., Disp: , Rfl:     levothyroxine (SYNTHROID) 125 MCG tablet, TAKE 1 TABLET EVERY MORNING ON AN EMPTY STOMACH FOR THYROID, Disp: 30 tablet, Rfl: 0    losartan (COZAAR) 100 MG tablet, Take 1 tablet (100 mg total) by mouth once daily. FOR BLOOD PRESSURE, Disp: 90 tablet, Rfl: 0    phenytoin (DILANTIN) 100 MG ER capsule, Take 500 mg by mouth once daily. 300mg po am-200mg po hs c 50mg for total of 250mg po hs, Disp: , Rfl:     phenytoin (DILANTIN) 50 mg chewable tablet, Take 50 mg by mouth every evening., Disp: , Rfl:     prochlorperazine (COMPAZINE) 10 MG tablet, Take 10 mg by mouth 2 (two) times daily as needed., Disp: , Rfl:     rizatriptan (MAXALT) 10 MG tablet, Take 10 mg by mouth daily as needed., Disp: , Rfl:     hydrOXYzine (ATARAX) 50 MG tablet, Take 1 tablet (50 mg total) by mouth 4 (four) times daily as needed for Itching., Disp: 120 tablet, Rfl: 1    LATUDA 40 mg Tab tablet, Take 1 tablet (40 mg total) by mouth every evening., Disp: 30 tablet, Rfl: 1    sertraline (ZOLOFT) 100 MG tablet, Take 2 tablets (200 mg total) by mouth once daily., Disp: 60 tablet, Rfl: 1       Significant Labs: - none at this time    Significant Imaging: - none at this time    Physical Exam  Vitals and nursing note reviewed.   Constitutional:       General: She is awake.      Appearance: Normal appearance.   Musculoskeletal:      Comments: Full ROM   Neurological:      Mental Status: She is alert.   Psychiatric:         Attention and Perception: Attention and perception normal. She does not perceive auditory or visual hallucinations.         Mood and Affect: Affect normal.         Speech: Speech normal.         Behavior: Behavior is cooperative.         Thought Content: Thought content  "does not include homicidal or suicidal ideation.         Cognition and Memory: Cognition and memory normal.        Review of Systems     Mental Status Exam:  Presentation:  - Appearance: 37 y.o. year old White female, appears stated age, appears Casually dressed and Well groomed  - Motility: Erect when standing, Steady gait, No EPS or Tremors, No psychomotor agitation or retardation appreciated  - Behavior: calm, cooperative, good eye contact  Speech:  - Character/Organization: spontaneous, fluent, normal volume, normal rate, normal rhythm  Emotional State:  - Mood: "Content"   - Affect: congruent and appropriate  Thought:  - Process: logical, linear, organized , goal-directed  - Preoccupations: no ruminations, rituals, or phobias appreciated  - Delusions: no persecutory, paranoid, or grandiose delusions appreciated  - Perception: denies AVH, not actively responding to internal stimuli  - SI/HI: denies/denies  Sensorium & Intellect:  - Sensorium: AAOx4  - Memory: intact to recent and remote events  - Attention/Concentration: good/good  - Insight/Judgement: good/good    Gait: normal swing and stance  MSK:no rigidity appreciated    All other systems without acute issues unless noted in HPI      Assessment/Plan      ICD-10-CM ICD-9-CM    1. Bipolar I disorder  F31.9 296.7 LATUDA 40 mg Tab tablet      2. Generalized anxiety disorder  F41.1 300.02 hydrOXYzine (ATARAX) 50 MG tablet      sertraline (ZOLOFT) 100 MG tablet         Continue current medications without change    Potential side effects and risks vs benefits of current treatment plan reviewed with patient. Applicable black box warnings reviewed. Encouraged patient not to alter dosages or abruptly discontinue medications without contacting prescriber first, due to risk of worsening symptoms and decompensation of mental status. Warned of risks associated with herbal remedies and supplements while taking psychotropic medications and of the need to consult " prescriber prior to adding any of these to current regimen. Patient should abstain from abuse of alcohol, prescription medications, and illicit drugs. Reviewed when to contact clinic and/or seek emergent care, such as but not limited to, onset/worsening SI/HI, hallucinations, delusions, manic symptoms. Pt verbalized understanding and agreement of these warnings/recommendations and verbally consented to treatment plan.    Reviewed non-pharmacologic coping skills to decrease anxiety, such as deep breathing, journaling, exercise, recognizing triggers, meditation, healthy diet and exercise, routine sleep schedule, negative thought recognition, time for hobbies/interests, relaxation techniques, avoidance of substance abuse, limiting caffeine, benefits of counseling, and biofeedback. Patient verbalized understanding.      Follow up in about 2 months (around 8/27/2023) for Medication Management.    Saad Combs, YUANP

## 2023-07-05 ENCOUNTER — LAB VISIT (OUTPATIENT)
Dept: LAB | Facility: HOSPITAL | Age: 38
End: 2023-07-05
Attending: NURSE PRACTITIONER
Payer: MEDICAID

## 2023-07-05 DIAGNOSIS — I10 PRIMARY HYPERTENSION: ICD-10-CM

## 2023-07-05 DIAGNOSIS — E66.01 MORBID OBESITY: ICD-10-CM

## 2023-07-05 DIAGNOSIS — G43.909 MIGRAINE WITHOUT STATUS MIGRAINOSUS, NOT INTRACTABLE, UNSPECIFIED MIGRAINE TYPE: ICD-10-CM

## 2023-07-05 LAB
ALBUMIN SERPL-MCNC: 4.3 G/DL (ref 3.4–5)
ALBUMIN/GLOB SERPL: 1.5 RATIO
ALP SERPL-CCNC: 87 UNIT/L (ref 50–144)
ALT SERPL-CCNC: 19 UNIT/L (ref 1–45)
ANION GAP SERPL CALC-SCNC: 8 MEQ/L (ref 2–13)
AST SERPL-CCNC: 19 UNIT/L (ref 14–36)
BASOPHILS # BLD AUTO: 0.04 X10(3)/MCL (ref 0.01–0.08)
BASOPHILS NFR BLD AUTO: 0.4 % (ref 0.1–1.2)
BILIRUBIN DIRECT+TOT PNL SERPL-MCNC: 0.3 MG/DL (ref 0–1)
BUN SERPL-MCNC: 24 MG/DL (ref 7–20)
CALCIUM SERPL-MCNC: 8.8 MG/DL (ref 8.4–10.2)
CHLORIDE SERPL-SCNC: 111 MMOL/L (ref 98–110)
CHOLEST SERPL-MCNC: 186 MG/DL (ref 0–200)
CO2 SERPL-SCNC: 22 MMOL/L (ref 21–32)
CREAT SERPL-MCNC: 0.89 MG/DL (ref 0.66–1.25)
CREAT/UREA NIT SERPL: 27 (ref 12–20)
DEPRECATED CALCIDIOL+CALCIFEROL SERPL-MC: 34 NG/ML (ref 30–100)
EOSINOPHIL # BLD AUTO: 0.18 X10(3)/MCL (ref 0.04–0.36)
EOSINOPHIL NFR BLD AUTO: 2 % (ref 0.7–7)
ERYTHROCYTE [DISTWIDTH] IN BLOOD BY AUTOMATED COUNT: 13.7 % (ref 11–14.5)
EST. AVERAGE GLUCOSE BLD GHB EST-MCNC: 102.5 MG/DL (ref 70–115)
GFR SERPLBLD CREATININE-BSD FMLA CKD-EPI: 86 MLS/MIN/1.73/M2
GLOBULIN SER-MCNC: 2.9 GM/DL (ref 2–3.9)
GLUCOSE SERPL-MCNC: 100 MG/DL (ref 70–115)
HBA1C MFR BLD: 5.2 % (ref 4–6)
HCT VFR BLD AUTO: 40 % (ref 36–48)
HCV AB SERPL QL IA: NONREACTIVE
HDLC SERPL-MCNC: 52 MG/DL (ref 40–60)
HGB BLD-MCNC: 12.9 G/DL (ref 11.8–16)
HIV 1+2 AB+HIV1 P24 AG SERPL QL IA: NONREACTIVE
IMM GRANULOCYTES # BLD AUTO: 0.03 X10(3)/MCL (ref 0–0.03)
IMM GRANULOCYTES NFR BLD AUTO: 0.3 % (ref 0–0.5)
LDLC SERPL DIRECT ASSAY-SCNC: 103.4 MG/DL (ref 30–100)
LYMPHOCYTES # BLD AUTO: 2.44 X10(3)/MCL (ref 1.16–3.74)
LYMPHOCYTES NFR BLD AUTO: 27.3 % (ref 20–55)
MCH RBC QN AUTO: 28.8 PG (ref 27–34)
MCHC RBC AUTO-ENTMCNC: 32.3 G/DL (ref 31–37)
MCV RBC AUTO: 89.3 FL (ref 79–99)
MONOCYTES # BLD AUTO: 0.84 X10(3)/MCL (ref 0.24–0.36)
MONOCYTES NFR BLD AUTO: 9.4 % (ref 4.7–12.5)
NEUTROPHILS # BLD AUTO: 5.41 X10(3)/MCL (ref 1.56–6.13)
NEUTROPHILS NFR BLD AUTO: 60.6 % (ref 37–73)
NRBC BLD AUTO-RTO: 0 %
PLATELET # BLD AUTO: 311 X10(3)/MCL (ref 140–371)
PMV BLD AUTO: 8.4 FL (ref 9.4–12.4)
POTASSIUM SERPL-SCNC: 4.3 MMOL/L (ref 3.5–5.1)
PROT SERPL-MCNC: 7.2 GM/DL (ref 6.3–8.2)
RBC # BLD AUTO: 4.48 X10(6)/MCL (ref 4–5.1)
SODIUM SERPL-SCNC: 141 MMOL/L (ref 135–145)
TRIGL SERPL-MCNC: 126 MG/DL (ref 30–200)
TSH SERPL-ACNC: 1.75 UIU/ML (ref 0.36–3.74)
WBC # SPEC AUTO: 8.94 X10(3)/MCL (ref 4–11.5)

## 2023-07-05 PROCEDURE — 83036 HEMOGLOBIN GLYCOSYLATED A1C: CPT

## 2023-07-05 PROCEDURE — 82306 VITAMIN D 25 HYDROXY: CPT

## 2023-07-05 PROCEDURE — 80053 COMPREHEN METABOLIC PANEL: CPT

## 2023-07-05 PROCEDURE — 84443 ASSAY THYROID STIM HORMONE: CPT

## 2023-07-05 PROCEDURE — 85025 COMPLETE CBC W/AUTO DIFF WBC: CPT

## 2023-07-05 PROCEDURE — 86803 HEPATITIS C AB TEST: CPT

## 2023-07-05 PROCEDURE — 87389 HIV-1 AG W/HIV-1&-2 AB AG IA: CPT

## 2023-07-05 PROCEDURE — 80061 LIPID PANEL: CPT

## 2023-07-05 PROCEDURE — 36415 COLL VENOUS BLD VENIPUNCTURE: CPT

## 2023-07-11 DIAGNOSIS — E03.9 HYPOTHYROIDISM, UNSPECIFIED TYPE: ICD-10-CM

## 2023-07-11 RX ORDER — LEVOTHYROXINE SODIUM 125 UG/1
TABLET ORAL
Qty: 30 TABLET | Refills: 0 | Status: SHIPPED | OUTPATIENT
Start: 2023-07-11 | End: 2023-08-17 | Stop reason: SDUPTHER

## 2023-07-13 ENCOUNTER — HOSPITAL ENCOUNTER (EMERGENCY)
Facility: HOSPITAL | Age: 38
Discharge: HOME | End: 2023-07-13
Payer: MEDICAID

## 2023-07-13 VITALS
BODY MASS INDEX: 56.05 KG/M2 | HEIGHT: 58 IN | DIASTOLIC BLOOD PRESSURE: 93 MMHG | TEMPERATURE: 97 F | HEART RATE: 65 BPM | OXYGEN SATURATION: 96 % | RESPIRATION RATE: 18 BRPM | SYSTOLIC BLOOD PRESSURE: 140 MMHG | WEIGHT: 267 LBS

## 2023-07-13 DIAGNOSIS — F44.5 CONVERSION DISORDER WITH ATTACKS OR SEIZURES: Primary | ICD-10-CM

## 2023-07-13 DIAGNOSIS — R56.9 SEIZURE-LIKE ACTIVITY: ICD-10-CM

## 2023-07-13 LAB — PHENYTOIN SERPL-MCNC: 17.4 UG/ML (ref 10–20)

## 2023-07-13 PROCEDURE — 63600175 PHARM REV CODE 636 W HCPCS

## 2023-07-13 PROCEDURE — 36415 COLL VENOUS BLD VENIPUNCTURE: CPT

## 2023-07-13 PROCEDURE — 99284 EMERGENCY DEPT VISIT MOD MDM: CPT | Mod: 25

## 2023-07-13 PROCEDURE — 25000003 PHARM REV CODE 250

## 2023-07-13 PROCEDURE — 80185 ASSAY OF PHENYTOIN TOTAL: CPT

## 2023-07-13 PROCEDURE — 96374 THER/PROPH/DIAG INJ IV PUSH: CPT

## 2023-07-13 RX ORDER — LAMOTRIGINE 100 MG/1
100 TABLET ORAL
Qty: 150 TABLET | Refills: 0 | Status: SHIPPED | OUTPATIENT
Start: 2023-07-13 | End: 2023-08-28

## 2023-07-13 RX ORDER — LORAZEPAM 2 MG/ML
1 INJECTION INTRAMUSCULAR
Status: COMPLETED | OUTPATIENT
Start: 2023-07-13 | End: 2023-07-13

## 2023-07-13 RX ORDER — LAMOTRIGINE 100 MG/1
300 TABLET ORAL ONCE
Status: COMPLETED | OUTPATIENT
Start: 2023-07-13 | End: 2023-07-13

## 2023-07-13 RX ADMIN — LAMOTRIGINE 300 MG: 100 TABLET ORAL at 06:07

## 2023-07-13 RX ADMIN — LORAZEPAM 1 MG: 2 INJECTION INTRAMUSCULAR; INTRAVENOUS at 06:07

## 2023-07-13 NOTE — ED PROVIDER NOTES
Encounter Date: 7/13/2023       History     Chief Complaint   Patient presents with    Seizures     Onset 4x's today. Pt c/o having 4 seizures out of  seizure meds Lamictal pt is on Dilantin as well. Pt actively started to have ore and seizure in triage.      37-year-old female with history of seizure disorder and psychiatric illness, presents with recurrent seizures today, after running out of Lamictal.  She has been calling Dr. Alaniz, but he has not refilled her prescription.  She is also on Dilantin.  On arrival to room 135, she had some seizure-like activity, that involved fluttering eyelids mostly.  She had no postictal confusion.    The history is provided by the patient and a parent.   Review of patient's allergies indicates:   Allergen Reactions    Codeine Other (See Comments)     unknown     Past Medical History:   Diagnosis Date    Anxiety     Bipolar disorder     Epilepsy, unspecified, not intractable, with status epilepticus     GERD (gastroesophageal reflux disease)     Hematochezia     Hyperlipidemia     Hypertension     Hypothyroidism     Long term use of drug     Loose stools     Migraines     Obesity, unspecified     Schizoaffective disorder      Past Surgical History:   Procedure Laterality Date    CHOLECYSTECTOMY      esophagesophagostomy      INTRAUTERINE DEVICE INSERTION  05/25/2022    TONSILLECTOMY       Family History   Problem Relation Age of Onset    Diabetes type II Mother     Hypertension Mother     Diabetes type II Father     Hypertension Father     No Known Problems Sister     Depression Brother     Colon cancer Maternal Aunt     No Known Problems Paternal Aunt     No Known Problems Maternal Uncle     No Known Problems Paternal Uncle     Schizophrenia Maternal Grandfather     No Known Problems Maternal Grandmother     No Known Problems Paternal Grandfather     No Known Problems Paternal Grandmother     Bipolar disorder Cousin     No Known Problems Other      Social History      Tobacco Use    Smoking status: Former     Types: Cigarettes     Passive exposure: Past    Smokeless tobacco: Never   Substance Use Topics    Alcohol use: Never    Drug use: Never     Review of Systems   Constitutional:  Negative for fever.   HENT:  Negative for sore throat.    Respiratory:  Negative for shortness of breath.    Cardiovascular:  Negative for chest pain.   Gastrointestinal:  Negative for nausea.   Genitourinary:  Negative for dysuria.   Musculoskeletal:  Negative for back pain.   Skin:  Negative for rash.   Neurological:  Positive for seizures. Negative for weakness.   Hematological:  Does not bruise/bleed easily.   Psychiatric/Behavioral:  The patient is nervous/anxious.    All other systems reviewed and are negative.    Physical Exam     Initial Vitals [07/13/23 1803]   BP Pulse Resp Temp SpO2   (!) 146/90 86 18 97.1 °F (36.2 °C) 96 %      MAP       --         Physical Exam    Nursing note and vitals reviewed.  Constitutional: Vital signs are normal. She appears well-developed and well-nourished. She is cooperative.   HENT:   Head: Normocephalic and atraumatic.   Eyes: Conjunctivae, EOM and lids are normal. Pupils are equal, round, and reactive to light.   Neck: Trachea normal. Neck supple.   Normal range of motion.  Cardiovascular:  Normal rate, regular rhythm, normal heart sounds and intact distal pulses.           Pulmonary/Chest: Breath sounds normal.   Abdominal: Abdomen is soft. Bowel sounds are normal.   Musculoskeletal:      Cervical back: Normal, normal range of motion and neck supple.      Lumbar back: Normal.     Neurological: She is alert. Coordination normal.   Arrives with what appears to be nonepileptic seizure activity.   Skin: Skin is warm, dry and intact. Capillary refill takes less than 2 seconds.   Psychiatric: Her speech is normal. Judgment and thought content normal. Cognition and memory are normal.   She is anxious, exhibiting nonepileptic seizure activity.       ED Course    Procedures  Labs Reviewed   PHENYTOIN LEVEL, TOTAL - Normal          Imaging Results    None          Medications   LORazepam injection 1 mg (1 mg Intravenous Given 7/13/23 1817)   lamoTRIgine tablet 300 mg (300 mg Oral Given 7/13/23 1827)     Medical Decision Making:   Initial Assessment:   PNES, out of Lamictal  Differential Diagnosis:   Unusual seizure disorder  ED Management:  Ativan IV, Lamictal p.o.  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 Provider    lamoTRIgine (LAMICTAL) 100 MG tablet Take 1 tablet (100 mg total) by mouth 5 (five) times daily. 150 tablet 7/13/2023 8/12/2023 Donte Crabtree MD          Follow-up Information       Follow up With Specialties Details Why Contact Info    [facility] Call in 1 day  [address]  [address]  [facility]  [facility] [address]  [phone]               Donte Crabtree MD  07/13/23 1794

## 2023-07-18 ENCOUNTER — OFFICE VISIT (OUTPATIENT)
Dept: FAMILY MEDICINE | Facility: CLINIC | Age: 38
End: 2023-07-18
Payer: MEDICAID

## 2023-07-18 VITALS
SYSTOLIC BLOOD PRESSURE: 132 MMHG | TEMPERATURE: 97 F | HEIGHT: 58 IN | DIASTOLIC BLOOD PRESSURE: 88 MMHG | BODY MASS INDEX: 57.15 KG/M2 | OXYGEN SATURATION: 100 % | HEART RATE: 60 BPM | WEIGHT: 272.25 LBS

## 2023-07-18 DIAGNOSIS — G43.909 MIGRAINE WITHOUT STATUS MIGRAINOSUS, NOT INTRACTABLE, UNSPECIFIED MIGRAINE TYPE: ICD-10-CM

## 2023-07-18 DIAGNOSIS — F41.1 GENERALIZED ANXIETY DISORDER: ICD-10-CM

## 2023-07-18 DIAGNOSIS — G40.909 NONINTRACTABLE EPILEPSY WITHOUT STATUS EPILEPTICUS, UNSPECIFIED EPILEPSY TYPE: ICD-10-CM

## 2023-07-18 DIAGNOSIS — F31.9 BIPOLAR I DISORDER: ICD-10-CM

## 2023-07-18 DIAGNOSIS — G47.33 OSA (OBSTRUCTIVE SLEEP APNEA): ICD-10-CM

## 2023-07-18 DIAGNOSIS — E03.9 HYPOTHYROIDISM, UNSPECIFIED TYPE: ICD-10-CM

## 2023-07-18 DIAGNOSIS — Z00.01 ENCOUNTER FOR ROUTINE ADULT HEALTH EXAMINATION WITH ABNORMAL FINDINGS: Primary | ICD-10-CM

## 2023-07-18 DIAGNOSIS — G93.2 BENIGN INTRACRANIAL HYPERTENSION: ICD-10-CM

## 2023-07-18 DIAGNOSIS — I10 PRIMARY HYPERTENSION: ICD-10-CM

## 2023-07-18 DIAGNOSIS — E66.01 MORBID OBESITY: ICD-10-CM

## 2023-07-18 PROCEDURE — 1159F PR MEDICATION LIST DOCUMENTED IN MEDICAL RECORD: ICD-10-PCS | Mod: CPTII,,, | Performed by: NURSE PRACTITIONER

## 2023-07-18 PROCEDURE — 3044F HG A1C LEVEL LT 7.0%: CPT | Mod: CPTII,,, | Performed by: NURSE PRACTITIONER

## 2023-07-18 PROCEDURE — 3075F SYST BP GE 130 - 139MM HG: CPT | Mod: CPTII,,, | Performed by: NURSE PRACTITIONER

## 2023-07-18 PROCEDURE — 3075F PR MOST RECENT SYSTOLIC BLOOD PRESS GE 130-139MM HG: ICD-10-PCS | Mod: CPTII,,, | Performed by: NURSE PRACTITIONER

## 2023-07-18 PROCEDURE — 3008F PR BODY MASS INDEX (BMI) DOCUMENTED: ICD-10-PCS | Mod: CPTII,,, | Performed by: NURSE PRACTITIONER

## 2023-07-18 PROCEDURE — 4010F PR ACE/ARB THEARPY RXD/TAKEN: ICD-10-PCS | Mod: CPTII,,, | Performed by: NURSE PRACTITIONER

## 2023-07-18 PROCEDURE — 1160F RVW MEDS BY RX/DR IN RCRD: CPT | Mod: CPTII,,, | Performed by: NURSE PRACTITIONER

## 2023-07-18 PROCEDURE — 99395 PREV VISIT EST AGE 18-39: CPT | Mod: ,,, | Performed by: NURSE PRACTITIONER

## 2023-07-18 PROCEDURE — 3079F DIAST BP 80-89 MM HG: CPT | Mod: CPTII,,, | Performed by: NURSE PRACTITIONER

## 2023-07-18 PROCEDURE — 3008F BODY MASS INDEX DOCD: CPT | Mod: CPTII,,, | Performed by: NURSE PRACTITIONER

## 2023-07-18 PROCEDURE — 3044F PR MOST RECENT HEMOGLOBIN A1C LEVEL <7.0%: ICD-10-PCS | Mod: CPTII,,, | Performed by: NURSE PRACTITIONER

## 2023-07-18 PROCEDURE — 3079F PR MOST RECENT DIASTOLIC BLOOD PRESSURE 80-89 MM HG: ICD-10-PCS | Mod: CPTII,,, | Performed by: NURSE PRACTITIONER

## 2023-07-18 PROCEDURE — 1160F PR REVIEW ALL MEDS BY PRESCRIBER/CLIN PHARMACIST DOCUMENTED: ICD-10-PCS | Mod: CPTII,,, | Performed by: NURSE PRACTITIONER

## 2023-07-18 PROCEDURE — 4010F ACE/ARB THERAPY RXD/TAKEN: CPT | Mod: CPTII,,, | Performed by: NURSE PRACTITIONER

## 2023-07-18 PROCEDURE — 99395 PR PREVENTIVE VISIT,EST,18-39: ICD-10-PCS | Mod: ,,, | Performed by: NURSE PRACTITIONER

## 2023-07-18 PROCEDURE — 1159F MED LIST DOCD IN RCRD: CPT | Mod: CPTII,,, | Performed by: NURSE PRACTITIONER

## 2023-07-18 NOTE — PROGRESS NOTES
Patient ID: [id number]     Chief Complaint: Annual Exam      HPI:     Ebony Richard is a 37 y.o. female in the office for a wellness visit.  Her lab results were reviewed and discussed with her and her mother today.  She ran out of Lamictal and had an increase in seizure activity.  She had to go to the emergency room and was given emergency medication to stop her seizures as well as a refill of the Lamictal until her next neurology appointment at the end of this month.  She had a low Lamictal level in January and unfortunately missed her Neurology appointment to follow up on those results because she had a seizure.  She was rescheduled 2 times since then and his office canceled those appointments.  She was unable to contact them for several weeks to request a refill.  She is feeling better today.  She does have sleep apnea and is sleeping with her new CPAP machine.  This does help her feel better during the day.  She routinely sees Vicente Combs for mental health prescriptions.  EEG and MRI unknown.    Past Medical History:  has a past medical history of Anxiety, Bipolar disorder, Epilepsy, unspecified, not intractable, with status epilepticus, GERD (gastroesophageal reflux disease), Hematochezia, Hyperlipidemia, Hypertension, Hypothyroidism, Long term use of drug, Loose stools, Migraines, Obesity, unspecified, and Schizoaffective disorder.    Social History:  reports that she has quit smoking. Her smoking use included cigarettes. She has been exposed to tobacco smoke. She has never used smokeless tobacco. She reports that she does not drink alcohol and does not use drugs.    Current Outpatient Medications   Medication Instructions    acetaZOLAMIDE (DIAMOX) 500 mg, Oral, 2 times daily    hydrOXYzine (ATARAX) 50 mg, Oral, 4 times daily PRN    lamoTRIgine (LAMICTAL) 100 mg, Oral, 5 times daily    LATUDA 40 mg, Oral, Nightly    levonorgestreL (MIRENA) 20 mcg/24 hours (8 yrs) 52 mg IUD 1 each, Intrauterine, Once  "   levothyroxine (SYNTHROID) 125 MCG tablet TAKE 1 TABLET EVERY MORNING ON AN EMPTY STOMACH FOR THYROID    losartan (COZAAR) 100 mg, Oral, Daily, FOR BLOOD PRESSURE    phenytoin (DILANTIN) 50 mg, Oral, Nightly    phenytoin (DILANTIN) 500 mg, Oral, Daily, 300mg po am-200mg po hs c 50mg for total of 250mg po hs    prochlorperazine (COMPAZINE) 10 mg, Oral, 2 times daily PRN    rizatriptan (MAXALT) 10 mg, Oral, Daily PRN    sertraline (ZOLOFT) 200 mg, Oral, Daily     Patient is allergic to codeine.     Patient Care Team:  [facility] as PCP - General (Family Medicine)  Benji Alaniz MD (Neurology)  YUAN MeyerP as Nurse Practitioner (Psychiatry)  Brenda Heart NP as Nurse Practitioner (Obstetrics and Gynecology)     Subjective     Review of Systems    See HPI     Objective     Visit Vitals  /88 (BP Location: Left arm, Patient Position: Sitting)   Pulse 60   Temp 97.2 °F (36.2 °C) (Temporal)   Ht 4' 10" (1.473 m)   Wt 123.5 kg (272 lb 4.3 oz)   LMP 05/05/2023 (Approximate)   SpO2 100%   BMI 56.90 kg/m²       Physical Exam  Vitals reviewed.   Constitutional:       Appearance: She is obese.   HENT:      Mouth/Throat:      Mouth: Mucous membranes are moist.   Cardiovascular:      Rate and Rhythm: Normal rate and regular rhythm.      Heart sounds: Normal heart sounds.   Pulmonary:      Effort: Pulmonary effort is normal.      Breath sounds: Normal breath sounds.   Musculoskeletal:      Right lower leg: No edema.      Left lower leg: No edema.   Skin:     General: Skin is warm and dry.   Neurological:      Mental Status: She is alert and oriented to person, place, and time.   Psychiatric:         Attention and Perception: Attention and perception normal.         Mood and Affect: Mood is anxious.         Behavior: Behavior is cooperative.         Thought Content: Thought content normal. Thought content does not include suicidal ideation.       Assessment:       ICD-10-CM " ICD-9-CM   1. Encounter for routine adult health examination with abnormal findings  Z00.01 V70.0   2. Nonintractable epilepsy without status epilepticus, unspecified epilepsy type  G40.909 345.90   3. Migraine without status migrainosus, not intractable, unspecified migraine type  G43.909 346.90   4. Benign intracranial hypertension  G93.2 348.2   5. Primary hypertension  I10 401.9   6. Hypothyroidism, unspecified type  E03.9 244.9   7. Morbid obesity  E66.01 278.01   8. Bipolar I disorder  F31.9 296.7   9. Generalized anxiety disorder  F41.1 300.02        Plan:     Get UTD MRI brain, EEG  Referring to new neurologist for management of epilepsy, BIH, and migraines  Continue current medications, call for refills if out before next neurology appointment.    Follow up in about 6 weeks (around 8/29/2023) for Results, Wellness in 1 year, Labs Prior. In addition to their next scheduled appointment, the patient has also been instructed to follow up on as needed basis.     Future Appointments   Date Time Provider Department Center   8/28/2023  2:00 PM CARMELLA Meyer  [facility]   [facility]  [facility]   3/11/2024  2:00 PM Brenda Heart NP [facility]

## 2023-07-25 ENCOUNTER — HOSPITAL ENCOUNTER (OUTPATIENT)
Dept: RADIOLOGY | Facility: HOSPITAL | Age: 38
Discharge: HOME | End: 2023-07-25
Attending: NURSE PRACTITIONER
Payer: MEDICAID

## 2023-07-25 ENCOUNTER — TELEPHONE (OUTPATIENT)
Dept: FAMILY MEDICINE | Facility: CLINIC | Age: 38
End: 2023-07-25
Payer: MEDICAID

## 2023-07-25 DIAGNOSIS — G43.909 MIGRAINE WITHOUT STATUS MIGRAINOSUS, NOT INTRACTABLE, UNSPECIFIED MIGRAINE TYPE: ICD-10-CM

## 2023-07-25 DIAGNOSIS — G93.2 BENIGN INTRACRANIAL HYPERTENSION: ICD-10-CM

## 2023-07-25 DIAGNOSIS — G40.909 NONINTRACTABLE EPILEPSY WITHOUT STATUS EPILEPTICUS, UNSPECIFIED EPILEPSY TYPE: ICD-10-CM

## 2023-07-25 PROCEDURE — 70551 MRI BRAIN STEM W/O DYE: CPT | Mod: TC

## 2023-07-25 NOTE — TELEPHONE ENCOUNTER
----- Message from Franca Dang sent at 7/25/2023  9:12 AM CDT -----  Regarding: call back  Pt called says she had a EEG done last week, was wanting to know if we got the results back yet and if so, can someone fax the results to her neurologist she has an appt with him tomorrow.    [phone] Dr Alaniz

## 2023-07-25 NOTE — TELEPHONE ENCOUNTER
Called patient and she stated that she had EEG done at [facility] in [place]. I explained to her that we did not get the results , that she had to call and ask them to send to Dr Alaniz and also ask to send us a copy of EEG.

## 2023-08-17 DIAGNOSIS — E03.9 HYPOTHYROIDISM, UNSPECIFIED TYPE: ICD-10-CM

## 2023-08-17 DIAGNOSIS — I10 HYPERTENSION, UNSPECIFIED TYPE: ICD-10-CM

## 2023-08-17 RX ORDER — LEVOTHYROXINE SODIUM 125 UG/1
TABLET ORAL
Qty: 30 TABLET | Refills: 0 | Status: SHIPPED | OUTPATIENT
Start: 2023-08-17 | End: 2023-09-18

## 2023-08-17 RX ORDER — LOSARTAN POTASSIUM 100 MG/1
100 TABLET ORAL DAILY
Qty: 90 TABLET | Refills: 0 | Status: SHIPPED | OUTPATIENT
Start: 2023-08-17 | End: 2023-11-13

## 2023-08-28 ENCOUNTER — OFFICE VISIT (OUTPATIENT)
Dept: BEHAVIORAL HEALTH | Facility: CLINIC | Age: 38
End: 2023-08-28
Payer: MEDICAID

## 2023-08-28 VITALS
TEMPERATURE: 99 F | HEART RATE: 72 BPM | SYSTOLIC BLOOD PRESSURE: 122 MMHG | OXYGEN SATURATION: 97 % | DIASTOLIC BLOOD PRESSURE: 70 MMHG | WEIGHT: 272.5 LBS | HEIGHT: 58 IN | BODY MASS INDEX: 57.2 KG/M2

## 2023-08-28 DIAGNOSIS — F31.9 BIPOLAR I DISORDER: Primary | ICD-10-CM

## 2023-08-28 DIAGNOSIS — F41.1 GENERALIZED ANXIETY DISORDER: ICD-10-CM

## 2023-08-28 PROCEDURE — 1159F MED LIST DOCD IN RCRD: CPT | Mod: CPTII,,, | Performed by: NURSE PRACTITIONER

## 2023-08-28 PROCEDURE — 4010F ACE/ARB THERAPY RXD/TAKEN: CPT | Mod: CPTII,,, | Performed by: NURSE PRACTITIONER

## 2023-08-28 PROCEDURE — 3044F HG A1C LEVEL LT 7.0%: CPT | Mod: CPTII,,, | Performed by: NURSE PRACTITIONER

## 2023-08-28 PROCEDURE — 3044F PR MOST RECENT HEMOGLOBIN A1C LEVEL <7.0%: ICD-10-PCS | Mod: CPTII,,, | Performed by: NURSE PRACTITIONER

## 2023-08-28 PROCEDURE — 99214 PR OFFICE/OUTPT VISIT, EST, LEVL IV, 30-39 MIN: ICD-10-PCS | Mod: ,,, | Performed by: NURSE PRACTITIONER

## 2023-08-28 PROCEDURE — 1159F PR MEDICATION LIST DOCUMENTED IN MEDICAL RECORD: ICD-10-PCS | Mod: CPTII,,, | Performed by: NURSE PRACTITIONER

## 2023-08-28 PROCEDURE — 3008F PR BODY MASS INDEX (BMI) DOCUMENTED: ICD-10-PCS | Mod: CPTII,,, | Performed by: NURSE PRACTITIONER

## 2023-08-28 PROCEDURE — 3078F DIAST BP <80 MM HG: CPT | Mod: CPTII,,, | Performed by: NURSE PRACTITIONER

## 2023-08-28 PROCEDURE — 3074F PR MOST RECENT SYSTOLIC BLOOD PRESSURE < 130 MM HG: ICD-10-PCS | Mod: CPTII,,, | Performed by: NURSE PRACTITIONER

## 2023-08-28 PROCEDURE — 99214 OFFICE O/P EST MOD 30 MIN: CPT | Mod: ,,, | Performed by: NURSE PRACTITIONER

## 2023-08-28 PROCEDURE — 3074F SYST BP LT 130 MM HG: CPT | Mod: CPTII,,, | Performed by: NURSE PRACTITIONER

## 2023-08-28 PROCEDURE — 3078F PR MOST RECENT DIASTOLIC BLOOD PRESSURE < 80 MM HG: ICD-10-PCS | Mod: CPTII,,, | Performed by: NURSE PRACTITIONER

## 2023-08-28 PROCEDURE — 1160F RVW MEDS BY RX/DR IN RCRD: CPT | Mod: CPTII,,, | Performed by: NURSE PRACTITIONER

## 2023-08-28 PROCEDURE — 4010F PR ACE/ARB THEARPY RXD/TAKEN: ICD-10-PCS | Mod: CPTII,,, | Performed by: NURSE PRACTITIONER

## 2023-08-28 PROCEDURE — 1160F PR REVIEW ALL MEDS BY PRESCRIBER/CLIN PHARMACIST DOCUMENTED: ICD-10-PCS | Mod: CPTII,,, | Performed by: NURSE PRACTITIONER

## 2023-08-28 PROCEDURE — 3008F BODY MASS INDEX DOCD: CPT | Mod: CPTII,,, | Performed by: NURSE PRACTITIONER

## 2023-08-28 RX ORDER — SERTRALINE HYDROCHLORIDE 100 MG/1
200 TABLET, FILM COATED ORAL DAILY
Qty: 60 TABLET | Refills: 1 | Status: SHIPPED | OUTPATIENT
Start: 2023-08-28 | End: 2023-10-30 | Stop reason: SDUPTHER

## 2023-08-28 RX ORDER — LAMOTRIGINE 200 MG/1
1000 TABLET ORAL 2 TIMES DAILY
COMMUNITY
Start: 2023-08-22

## 2023-08-28 RX ORDER — HYDROXYZINE HYDROCHLORIDE 50 MG/1
50 TABLET, FILM COATED ORAL 4 TIMES DAILY PRN
Qty: 120 TABLET | Refills: 1 | Status: SHIPPED | OUTPATIENT
Start: 2023-08-28 | End: 2023-10-30 | Stop reason: SDUPTHER

## 2023-08-28 RX ORDER — LURASIDONE HYDROCHLORIDE 40 MG/1
40 TABLET, FILM COATED ORAL NIGHTLY
Qty: 30 TABLET | Refills: 1 | Status: SHIPPED | OUTPATIENT
Start: 2023-08-28 | End: 2023-10-30 | Stop reason: SDUPTHER

## 2023-08-28 NOTE — PROGRESS NOTES
"PSYCHIATRIC FOLLOW-UP VISIT NOTE    Chief Complaint   Patient presents with    Manic Behavior         History of Present Illness  37 y.o. year old White female with hx of bipolar disorder and ELIZA seen today for follow-up appointment and medication management.  She is accompanied by her mother to today's visit.  Patient reports she is been doing quite well since last visit.  Recently her neurologist increased her Lamictal dose.  Since that time she is noticed decreased anxiety and irritability.  She is continued to venture out of her house and has been less isolative.  Interacting more with staff and other patients at the grocery store near her home.  Denies any recent panic attacks.  Describes mood is happy most days of the week.  Also describes mood as stable and denies any mood lability.  She is sleeping well at night and feels rested in the morning.  Denies any side effects from current medication regimen. Patient denies SI/HI. Denies hallucinations and does not appear to be responding to internal stimuli or be internally preoccupied. No manic symptoms noted. Tolerating SGA therapy without serious side effects. No NMS s/s. No EPS or TD symptoms noted. AIMS=0.        Objective:     Vitals:  Vitals:    08/28/23 1400   BP: 122/70   BP Location: Right arm   Patient Position: Sitting   BP Method: Large (Manual)   Pulse: 72   Temp: 98.6 °F (37 °C)   TempSrc: Temporal   SpO2: 97%   Weight: 123.6 kg (272 lb 7.8 oz)   Height: 4' 10.27" (1.48 m)       Wt Readings from Last 3 Encounters:   08/28/23 1400 123.6 kg (272 lb 7.8 oz)   07/18/23 0837 123.5 kg (272 lb 4.3 oz)   07/13/23 1803 121.1 kg (267 lb)         Medication:    Current Outpatient Medications:     acetaZOLAMIDE (DIAMOX) 250 MG tablet, Take 500 mg by mouth 2 (two) times daily., Disp: , Rfl:     levonorgestreL (MIRENA) 20 mcg/24 hours (8 yrs) 52 mg IUD, 1 each by Intrauterine route once., Disp: , Rfl:     levothyroxine (SYNTHROID) 125 MCG tablet, TAKE 1 TABLET " EVERY MORNING ON AN EMPTY STOMACH FOR THYROID, Disp: 30 tablet, Rfl: 0    losartan (COZAAR) 100 MG tablet, Take 1 tablet (100 mg total) by mouth once daily. FOR BLOOD PRESSURE, Disp: 90 tablet, Rfl: 0    phenytoin (DILANTIN) 100 MG ER capsule, Take 500 mg by mouth once daily. 300mg po am-200mg po hs c 50mg for total of 250mg po hs, Disp: , Rfl:     phenytoin (DILANTIN) 50 mg chewable tablet, Take 50 mg by mouth every evening., Disp: , Rfl:     rizatriptan (MAXALT) 10 MG tablet, Take 10 mg by mouth daily as needed., Disp: , Rfl:     hydrOXYzine (ATARAX) 50 MG tablet, Take 1 tablet (50 mg total) by mouth 4 (four) times daily as needed for Itching., Disp: 120 tablet, Rfl: 1    lamoTRIgine (LAMICTAL) 200 MG tablet, Take 1,000 mg by mouth 2 (two) times a day. 2 tablets(400mg) in am and 3(600mg) tablets in pm, Disp: , Rfl:     LATUDA 40 mg Tab tablet, Take 1 tablet (40 mg total) by mouth every evening., Disp: 30 tablet, Rfl: 1    prochlorperazine (COMPAZINE) 10 MG tablet, Take 10 mg by mouth 2 (two) times daily as needed., Disp: , Rfl:     sertraline (ZOLOFT) 100 MG tablet, Take 2 tablets (200 mg total) by mouth once daily., Disp: 60 tablet, Rfl: 1       Significant Labs: - none at this time    Significant Imaging: - none at this time    Physical Exam  Vitals and nursing note reviewed.   Constitutional:       General: She is awake.      Appearance: Normal appearance.   Musculoskeletal:      Comments: Full ROM   Neurological:      Mental Status: She is alert.   Psychiatric:         Attention and Perception: Attention and perception normal. She does not perceive auditory or visual hallucinations.         Mood and Affect: Affect normal.         Speech: Speech normal.         Behavior: Behavior is cooperative.         Thought Content: Thought content does not include homicidal or suicidal ideation.         Cognition and Memory: Cognition and memory normal.          Review of Systems     Mental Status Exam:  Presentation:  -  "Appearance: 37 y.o. year old White female, appears stated age, appears Casually dressed and Well groomed  - Motility: Erect when standing, Steady gait, No EPS or Tremors, No psychomotor agitation or retardation appreciated  - Behavior: calm, cooperative, good eye contact  Speech:  - Character/Organization: spontaneous, fluent, normal volume, normal rate, normal rhythm  Emotional State:  - Mood: "Happy"   - Affect: congruent and appropriate  Thought:  - Process: logical, linear, organized , goal-directed  - Preoccupations: no ruminations, rituals, or phobias appreciated  - Delusions: no persecutory, paranoid, or grandiose delusions appreciated  - Perception: denies AVH, not actively responding to internal stimuli  - SI/HI: denies/denies  Sensorium & Intellect:  - Sensorium: AAOx4  - Memory: intact to recent and remote events  - Attention/Concentration: good/good  - Insight/Judgement: good/good    Gait: normal swing and stance  MSK:no rigidity appreciated    All other systems without acute issues unless noted in HPI      Assessment/Plan      ICD-10-CM ICD-9-CM    1. Bipolar I disorder  F31.9 296.7 LATUDA 40 mg Tab tablet      2. Generalized anxiety disorder  F41.1 300.02 sertraline (ZOLOFT) 100 MG tablet      hydrOXYzine (ATARAX) 50 MG tablet         Continue current medications without change    Potential side effects and risks vs benefits of current treatment plan reviewed with patient. Applicable black box warnings reviewed. Encouraged patient not to alter dosages or abruptly discontinue medications without contacting prescriber first, due to risk of worsening symptoms and decompensation of mental status. Warned of risks associated with herbal remedies and supplements while taking psychotropic medications and of the need to consult prescriber prior to adding any of these to current regimen. Patient should abstain from abuse of alcohol, prescription medications, and illicit drugs. Reviewed when to contact clinic " and/or seek emergent care, such as but not limited to, onset/worsening SI/HI, hallucinations, delusions, manic symptoms. Pt verbalized understanding and agreement of these warnings/recommendations and verbally consented to treatment plan.      Follow up in about 2 months (around 10/28/2023) for Medication Management.    Saad Combs, PMMOHITP

## 2023-09-08 ENCOUNTER — OFFICE VISIT (OUTPATIENT)
Dept: FAMILY MEDICINE | Facility: CLINIC | Age: 38
End: 2023-09-08
Payer: MEDICAID

## 2023-09-08 VITALS
BODY MASS INDEX: 56.45 KG/M2 | DIASTOLIC BLOOD PRESSURE: 78 MMHG | OXYGEN SATURATION: 100 % | HEIGHT: 58 IN | WEIGHT: 268.94 LBS | HEART RATE: 61 BPM | TEMPERATURE: 97 F | SYSTOLIC BLOOD PRESSURE: 112 MMHG

## 2023-09-08 DIAGNOSIS — H93.11 TINNITUS OF RIGHT EAR: Primary | ICD-10-CM

## 2023-09-08 DIAGNOSIS — I10 HYPERTENSION, UNSPECIFIED TYPE: ICD-10-CM

## 2023-09-08 DIAGNOSIS — Z79.899 ON LONG TERM DRUG THERAPY: ICD-10-CM

## 2023-09-08 DIAGNOSIS — G40.909 NONINTRACTABLE EPILEPSY WITHOUT STATUS EPILEPTICUS, UNSPECIFIED EPILEPSY TYPE: ICD-10-CM

## 2023-09-08 PROCEDURE — 3074F PR MOST RECENT SYSTOLIC BLOOD PRESSURE < 130 MM HG: ICD-10-PCS | Mod: CPTII,,, | Performed by: NURSE PRACTITIONER

## 2023-09-08 PROCEDURE — 3008F BODY MASS INDEX DOCD: CPT | Mod: CPTII,,, | Performed by: NURSE PRACTITIONER

## 2023-09-08 PROCEDURE — 99214 OFFICE O/P EST MOD 30 MIN: CPT | Mod: ,,, | Performed by: NURSE PRACTITIONER

## 2023-09-08 PROCEDURE — 4010F ACE/ARB THERAPY RXD/TAKEN: CPT | Mod: CPTII,,, | Performed by: NURSE PRACTITIONER

## 2023-09-08 PROCEDURE — 4010F PR ACE/ARB THEARPY RXD/TAKEN: ICD-10-PCS | Mod: CPTII,,, | Performed by: NURSE PRACTITIONER

## 2023-09-08 PROCEDURE — 1159F PR MEDICATION LIST DOCUMENTED IN MEDICAL RECORD: ICD-10-PCS | Mod: CPTII,,, | Performed by: NURSE PRACTITIONER

## 2023-09-08 PROCEDURE — 99214 PR OFFICE/OUTPT VISIT, EST, LEVL IV, 30-39 MIN: ICD-10-PCS | Mod: ,,, | Performed by: NURSE PRACTITIONER

## 2023-09-08 PROCEDURE — 1160F RVW MEDS BY RX/DR IN RCRD: CPT | Mod: CPTII,,, | Performed by: NURSE PRACTITIONER

## 2023-09-08 PROCEDURE — 3074F SYST BP LT 130 MM HG: CPT | Mod: CPTII,,, | Performed by: NURSE PRACTITIONER

## 2023-09-08 PROCEDURE — 3078F DIAST BP <80 MM HG: CPT | Mod: CPTII,,, | Performed by: NURSE PRACTITIONER

## 2023-09-08 PROCEDURE — 3044F PR MOST RECENT HEMOGLOBIN A1C LEVEL <7.0%: ICD-10-PCS | Mod: CPTII,,, | Performed by: NURSE PRACTITIONER

## 2023-09-08 PROCEDURE — 3078F PR MOST RECENT DIASTOLIC BLOOD PRESSURE < 80 MM HG: ICD-10-PCS | Mod: CPTII,,, | Performed by: NURSE PRACTITIONER

## 2023-09-08 PROCEDURE — 1160F PR REVIEW ALL MEDS BY PRESCRIBER/CLIN PHARMACIST DOCUMENTED: ICD-10-PCS | Mod: CPTII,,, | Performed by: NURSE PRACTITIONER

## 2023-09-08 PROCEDURE — 3008F PR BODY MASS INDEX (BMI) DOCUMENTED: ICD-10-PCS | Mod: CPTII,,, | Performed by: NURSE PRACTITIONER

## 2023-09-08 PROCEDURE — 3044F HG A1C LEVEL LT 7.0%: CPT | Mod: CPTII,,, | Performed by: NURSE PRACTITIONER

## 2023-09-08 PROCEDURE — 1159F MED LIST DOCD IN RCRD: CPT | Mod: CPTII,,, | Performed by: NURSE PRACTITIONER

## 2023-09-08 NOTE — PROGRESS NOTES
Patient ID: [id number]     Chief Complaint: Results (MRI)      HPI:     Ebony Richard is a 37 y.o. female in the office for a routine visit.  She's here to discuss the results of her MRI that she has already showed and discussed with her neurologist.  He does not agree with a diagnosis of MS. He did increase her Lamictal dose.  No levels have been drawn.  No seizures since increase. No recent EEG. She is sleeping with CPAP.      Today she c/o ringing in her right ear for months.  Decreased hearing.  Would like to see a specialist.     7/25/23 MRI brain:   Very subtle changes suggest bilaterally symmetric edematous changes within the optic nerve sheaths (bilaterally).  These findings are nonspecific, however, in light of the patient's age and clinical history the possibility of changes related to multiple sclerosis should be excluded clinically.  No additional intracranial stigmata of multiple sclerosis are appreciated.      Past Medical History:  has a past medical history of Anxiety, Bipolar disorder, Epilepsy, unspecified, not intractable, with status epilepticus, GERD (gastroesophageal reflux disease), Hematochezia, Hyperlipidemia, Hypertension, Hypothyroidism, Long term use of drug, Loose stools, Migraines, Obesity, unspecified, and Schizoaffective disorder.    Social History:  reports that she has quit smoking. Her smoking use included cigarettes. She has been exposed to tobacco smoke. She has never used smokeless tobacco. She reports that she does not drink alcohol and does not use drugs.    Current Outpatient Medications   Medication Instructions    acetaZOLAMIDE (DIAMOX) 500 mg, Oral, 2 times daily    hydrOXYzine (ATARAX) 50 mg, Oral, 4 times daily PRN    lamoTRIgine (LAMICTAL) 1,000 mg, Oral, 2 times daily, 2 tablets(400mg) in am and 3(600mg) tablets in pm    LATUDA 40 mg, Oral, Nightly    levonorgestreL (MIRENA) 20 mcg/24 hours (8 yrs) 52 mg IUD 1 each, Intrauterine, Once    levothyroxine (SYNTHROID)  "125 MCG tablet TAKE 1 TABLET EVERY MORNING ON AN EMPTY STOMACH FOR THYROID    losartan (COZAAR) 100 mg, Oral, Daily, FOR BLOOD PRESSURE    phenytoin (DILANTIN) 50 mg, Oral, Nightly    phenytoin (DILANTIN) 500 mg, Oral, Daily, 300mg po am-200mg po hs c 50mg for total of 250mg po hs    prochlorperazine (COMPAZINE) 10 mg, Oral, 2 times daily PRN    rizatriptan (MAXALT) 10 mg, Oral, Daily PRN    sertraline (ZOLOFT) 200 mg, Oral, Daily       Patient is allergic to codeine.     Patient Care Team:  Evelyn Chun FNP-C as PCP - General (Family Medicine)  Benji Alaniz MD (Neurology)  Saad Combs PMHNP as Nurse Practitioner (Psychiatry)  Brenda Heart NP as Nurse Practitioner (Obstetrics and Gynecology)       Subjective     Review of Systems    See HPI     Objective     Visit Vitals  /78 (BP Location: Left arm, Patient Position: Sitting, BP Method: Large (Manual))   Pulse 61   Temp 97.3 °F (36.3 °C) (Temporal)   Ht 4' 10.27" (1.48 m)   Wt 122 kg (268 lb 15.4 oz)   LMP 07/05/2023 (Approximate)   SpO2 100%   BMI 55.69 kg/m²       Physical Exam  Vitals reviewed.   Constitutional:       Appearance: Normal appearance.   Cardiovascular:      Rate and Rhythm: Normal rate and regular rhythm.      Heart sounds: Normal heart sounds.   Pulmonary:      Effort: Pulmonary effort is normal.      Breath sounds: Normal breath sounds.   Skin:     General: Skin is warm and dry.   Neurological:      Mental Status: She is alert and oriented to person, place, and time.   Psychiatric:         Mood and Affect: Mood normal.         Behavior: Behavior normal.         Thought Content: Thought content normal.         Judgment: Judgment normal.       Assessment:       ICD-10-CM ICD-9-CM   1. Tinnitus of right ear  H93.11 388.30   2. Nonintractable epilepsy without status epilepticus, unspecified epilepsy type  G40.909 345.90   3. Hypertension, unspecified type  I10 401.9   4. On long term drug therapy  " Z79.899 V58.69        Plan:     Follow up on EEG and return for results  Check cbc, cmp, Lamictal level.  Referring to ENT for tinnitus.     Follow up in about 6 weeks (around 10/20/2023) for Results EEG. In addition to their next scheduled appointment, the patient has also been instructed to follow up on as needed basis.     Future Appointments   Date Time Provider Department Center   10/30/2023 11:00 AM Saad Combs, PMHNP [facility] Rad[facility]   1/5/2024  8:20 AM LAB, [facility]   1/9/2024 11:00 [facility]   3/11/2024  2:00 PM Brenda Heart NP [facility] PARVEEN Garcia OB   5/7/2024  1:15 PM Shannon Monge MD [facility] NEURO [place]   7/16/2024  8:50 AM LAB, [facility] LABORATORY DRAW STATION [facility] WOLFGANG Garcia [facility]   7/23/2024  8:30 AM Evelyn Chun FNP-C [facility] Radha [facility]

## 2023-09-18 DIAGNOSIS — E03.9 HYPOTHYROIDISM, UNSPECIFIED TYPE: ICD-10-CM

## 2023-09-18 RX ORDER — LEVOTHYROXINE SODIUM 125 UG/1
TABLET ORAL
Qty: 30 TABLET | Refills: 0 | Status: SHIPPED | OUTPATIENT
Start: 2023-09-18 | End: 2023-10-16

## 2023-10-03 ENCOUNTER — PATIENT MESSAGE (OUTPATIENT)
Dept: FAMILY MEDICINE | Facility: CLINIC | Age: 38
End: 2023-10-03
Payer: MEDICAID

## 2023-10-15 DIAGNOSIS — E03.9 HYPOTHYROIDISM, UNSPECIFIED TYPE: ICD-10-CM

## 2023-10-16 RX ORDER — LEVOTHYROXINE SODIUM 125 UG/1
TABLET ORAL
Qty: 30 TABLET | Refills: 0 | Status: SHIPPED | OUTPATIENT
Start: 2023-10-16 | End: 2023-11-13

## 2023-10-25 ENCOUNTER — TELEPHONE (OUTPATIENT)
Dept: FAMILY MEDICINE | Facility: CLINIC | Age: 38
End: 2023-10-25
Payer: MEDICAID

## 2023-10-25 NOTE — TELEPHONE ENCOUNTER
PT states she has had 2 episodes in the last 3 weeks of low blood sugar. She states when this occurs she eats candy. She states that when her blood sugar gets to 72 she eats candy. She states she is checking her sugar twice a day. She notice that in the afternoon her blood sugar gets low. She states her blood sugar is 103 now. I advised her chart her blood sugars and what she ate that day and bring to her next apt. She verbalized understanding.

## 2023-10-30 ENCOUNTER — OFFICE VISIT (OUTPATIENT)
Dept: BEHAVIORAL HEALTH | Facility: CLINIC | Age: 38
End: 2023-10-30
Payer: MEDICAID

## 2023-10-30 VITALS
BODY MASS INDEX: 57.57 KG/M2 | HEIGHT: 58 IN | HEART RATE: 72 BPM | WEIGHT: 274.25 LBS | OXYGEN SATURATION: 99 % | TEMPERATURE: 98 F | DIASTOLIC BLOOD PRESSURE: 78 MMHG | SYSTOLIC BLOOD PRESSURE: 126 MMHG

## 2023-10-30 DIAGNOSIS — F44.5 CONVERSION DISORDER WITH ATTACKS OR SEIZURES: ICD-10-CM

## 2023-10-30 DIAGNOSIS — F31.9 BIPOLAR I DISORDER: Primary | ICD-10-CM

## 2023-10-30 DIAGNOSIS — F41.1 GENERALIZED ANXIETY DISORDER: ICD-10-CM

## 2023-10-30 PROCEDURE — 1160F RVW MEDS BY RX/DR IN RCRD: CPT | Mod: CPTII,,, | Performed by: NURSE PRACTITIONER

## 2023-10-30 PROCEDURE — 1159F PR MEDICATION LIST DOCUMENTED IN MEDICAL RECORD: ICD-10-PCS | Mod: CPTII,,, | Performed by: NURSE PRACTITIONER

## 2023-10-30 PROCEDURE — 4010F PR ACE/ARB THEARPY RXD/TAKEN: ICD-10-PCS | Mod: CPTII,,, | Performed by: NURSE PRACTITIONER

## 2023-10-30 PROCEDURE — 3008F BODY MASS INDEX DOCD: CPT | Mod: CPTII,,, | Performed by: NURSE PRACTITIONER

## 2023-10-30 PROCEDURE — 99214 PR OFFICE/OUTPT VISIT, EST, LEVL IV, 30-39 MIN: ICD-10-PCS | Mod: ,,, | Performed by: NURSE PRACTITIONER

## 2023-10-30 PROCEDURE — 3078F DIAST BP <80 MM HG: CPT | Mod: CPTII,,, | Performed by: NURSE PRACTITIONER

## 2023-10-30 PROCEDURE — 1159F MED LIST DOCD IN RCRD: CPT | Mod: CPTII,,, | Performed by: NURSE PRACTITIONER

## 2023-10-30 PROCEDURE — 1160F PR REVIEW ALL MEDS BY PRESCRIBER/CLIN PHARMACIST DOCUMENTED: ICD-10-PCS | Mod: CPTII,,, | Performed by: NURSE PRACTITIONER

## 2023-10-30 PROCEDURE — 3078F PR MOST RECENT DIASTOLIC BLOOD PRESSURE < 80 MM HG: ICD-10-PCS | Mod: CPTII,,, | Performed by: NURSE PRACTITIONER

## 2023-10-30 PROCEDURE — 3008F PR BODY MASS INDEX (BMI) DOCUMENTED: ICD-10-PCS | Mod: CPTII,,, | Performed by: NURSE PRACTITIONER

## 2023-10-30 PROCEDURE — 3044F HG A1C LEVEL LT 7.0%: CPT | Mod: CPTII,,, | Performed by: NURSE PRACTITIONER

## 2023-10-30 PROCEDURE — 99214 OFFICE O/P EST MOD 30 MIN: CPT | Mod: ,,, | Performed by: NURSE PRACTITIONER

## 2023-10-30 PROCEDURE — 4010F ACE/ARB THERAPY RXD/TAKEN: CPT | Mod: CPTII,,, | Performed by: NURSE PRACTITIONER

## 2023-10-30 PROCEDURE — 3044F PR MOST RECENT HEMOGLOBIN A1C LEVEL <7.0%: ICD-10-PCS | Mod: CPTII,,, | Performed by: NURSE PRACTITIONER

## 2023-10-30 PROCEDURE — 3074F PR MOST RECENT SYSTOLIC BLOOD PRESSURE < 130 MM HG: ICD-10-PCS | Mod: CPTII,,, | Performed by: NURSE PRACTITIONER

## 2023-10-30 PROCEDURE — 3074F SYST BP LT 130 MM HG: CPT | Mod: CPTII,,, | Performed by: NURSE PRACTITIONER

## 2023-10-30 RX ORDER — HYDROXYZINE HYDROCHLORIDE 50 MG/1
50 TABLET, FILM COATED ORAL 4 TIMES DAILY PRN
Qty: 120 TABLET | Refills: 1 | Status: SHIPPED | OUTPATIENT
Start: 2023-10-30 | End: 2024-01-19 | Stop reason: SDUPTHER

## 2023-10-30 RX ORDER — SERTRALINE HYDROCHLORIDE 100 MG/1
200 TABLET, FILM COATED ORAL DAILY
Qty: 60 TABLET | Refills: 1 | Status: SHIPPED | OUTPATIENT
Start: 2023-10-30 | End: 2024-01-19 | Stop reason: SDUPTHER

## 2023-10-30 RX ORDER — LURASIDONE HYDROCHLORIDE 40 MG/1
40 TABLET, FILM COATED ORAL NIGHTLY
Qty: 30 TABLET | Refills: 1 | Status: SHIPPED | OUTPATIENT
Start: 2023-10-30 | End: 2024-01-19 | Stop reason: DRUGHIGH
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
## 2023-11-13 DIAGNOSIS — I10 HYPERTENSION, UNSPECIFIED TYPE: ICD-10-CM

## 2023-11-13 DIAGNOSIS — E03.9 HYPOTHYROIDISM, UNSPECIFIED TYPE: ICD-10-CM

## 2023-11-13 RX ORDER — LOSARTAN POTASSIUM 100 MG/1
100 TABLET ORAL
Qty: 90 TABLET | Refills: 0 | Status: SHIPPED | OUTPATIENT
Start: 2023-11-13 | End: 2024-02-05 | Stop reason: SDUPTHER

## 2023-11-13 RX ORDER — LEVOTHYROXINE SODIUM 125 UG/1
TABLET ORAL
Qty: 30 TABLET | Refills: 1 | Status: SHIPPED | OUTPATIENT
Start: 2023-11-13 | End: 2024-01-09

## 2024-01-05 ENCOUNTER — LAB VISIT (OUTPATIENT)
Dept: LAB | Facility: CLINIC | Age: 39
End: 2024-01-05
Payer: MEDICAID

## 2024-01-05 DIAGNOSIS — Z79.899 ON LONG TERM DRUG THERAPY: ICD-10-CM

## 2024-01-05 DIAGNOSIS — G40.909 NONINTRACTABLE EPILEPSY WITHOUT STATUS EPILEPTICUS, UNSPECIFIED EPILEPSY TYPE: ICD-10-CM

## 2024-01-05 DIAGNOSIS — I10 HYPERTENSION, UNSPECIFIED TYPE: ICD-10-CM

## 2024-01-05 DIAGNOSIS — H93.11 TINNITUS OF RIGHT EAR: ICD-10-CM

## 2024-01-05 LAB
ALBUMIN SERPL-MCNC: 4.3 G/DL (ref 3.4–5)
ALBUMIN/GLOB SERPL: 1.4 RATIO
ALP SERPL-CCNC: 107 UNIT/L (ref 50–144)
ALT SERPL-CCNC: 20 UNIT/L (ref 1–45)
ANION GAP SERPL CALC-SCNC: 9 MEQ/L (ref 2–13)
AST SERPL-CCNC: 24 UNIT/L (ref 14–36)
BASOPHILS # BLD AUTO: 0.03 X10(3)/MCL (ref 0.01–0.08)
BASOPHILS NFR BLD AUTO: 0.3 % (ref 0.1–1.2)
BILIRUB SERPL-MCNC: 0.4 MG/DL (ref 0–1)
BUN SERPL-MCNC: 20 MG/DL (ref 7–20)
CALCIUM SERPL-MCNC: 9 MG/DL (ref 8.4–10.2)
CHLORIDE SERPL-SCNC: 110 MMOL/L (ref 98–110)
CO2 SERPL-SCNC: 22 MMOL/L (ref 21–32)
CREAT SERPL-MCNC: 0.83 MG/DL (ref 0.66–1.25)
CREAT/UREA NIT SERPL: 24 (ref 12–20)
EOSINOPHIL # BLD AUTO: 0.4 X10(3)/MCL (ref 0.04–0.36)
EOSINOPHIL NFR BLD AUTO: 4.3 % (ref 0.7–7)
ERYTHROCYTE [DISTWIDTH] IN BLOOD BY AUTOMATED COUNT: 13.9 % (ref 11–14.5)
GFR SERPLBLD CREATININE-BSD FMLA CKD-EPI: >90 MLS/MIN/1.73/M2
GLOBULIN SER-MCNC: 3.1 GM/DL (ref 2–3.9)
GLUCOSE SERPL-MCNC: 108 MG/DL (ref 70–115)
HCT VFR BLD AUTO: 40.6 % (ref 36–48)
HGB BLD-MCNC: 13 G/DL (ref 11.8–16)
IMM GRANULOCYTES # BLD AUTO: 0.05 X10(3)/MCL (ref 0–0.03)
IMM GRANULOCYTES NFR BLD AUTO: 0.5 % (ref 0–0.5)
LYMPHOCYTES # BLD AUTO: 2.45 X10(3)/MCL (ref 1.16–3.74)
LYMPHOCYTES NFR BLD AUTO: 26.5 % (ref 20–55)
MCH RBC QN AUTO: 28.7 PG (ref 27–34)
MCHC RBC AUTO-ENTMCNC: 32 G/DL (ref 31–37)
MCV RBC AUTO: 89.6 FL (ref 79–99)
MONOCYTES # BLD AUTO: 0.85 X10(3)/MCL (ref 0.24–0.36)
MONOCYTES NFR BLD AUTO: 9.2 % (ref 4.7–12.5)
NEUTROPHILS # BLD AUTO: 5.48 X10(3)/MCL (ref 1.56–6.13)
NEUTROPHILS NFR BLD AUTO: 59.2 % (ref 37–73)
NRBC BLD AUTO-RTO: 0 %
PLATELET # BLD AUTO: 328 X10(3)/MCL (ref 140–371)
PMV BLD AUTO: 9 FL (ref 9.4–12.4)
POTASSIUM SERPL-SCNC: 4.3 MMOL/L (ref 3.5–5.1)
PROT SERPL-MCNC: 7.4 GM/DL (ref 6.3–8.2)
RBC # BLD AUTO: 4.53 X10(6)/MCL (ref 4–5.1)
SODIUM SERPL-SCNC: 141 MMOL/L (ref 135–145)
WBC # SPEC AUTO: 9.26 X10(3)/MCL (ref 4–11.5)

## 2024-01-05 PROCEDURE — 80175 DRUG SCREEN QUAN LAMOTRIGINE: CPT

## 2024-01-06 LAB — LAMOTRIGINE SERPL-MCNC: 12 MCG/ML (ref 3–15)

## 2024-01-08 DIAGNOSIS — E03.9 HYPOTHYROIDISM, UNSPECIFIED TYPE: ICD-10-CM

## 2024-01-09 ENCOUNTER — OFFICE VISIT (OUTPATIENT)
Dept: FAMILY MEDICINE | Facility: CLINIC | Age: 39
End: 2024-01-09
Payer: MEDICAID

## 2024-01-09 VITALS
SYSTOLIC BLOOD PRESSURE: 130 MMHG | OXYGEN SATURATION: 97 % | WEIGHT: 275.19 LBS | TEMPERATURE: 98 F | HEIGHT: 58 IN | HEART RATE: 88 BPM | BODY MASS INDEX: 57.76 KG/M2 | DIASTOLIC BLOOD PRESSURE: 78 MMHG

## 2024-01-09 DIAGNOSIS — K27.9 PUD (PEPTIC ULCER DISEASE): ICD-10-CM

## 2024-01-09 DIAGNOSIS — G47.33 OSA (OBSTRUCTIVE SLEEP APNEA): ICD-10-CM

## 2024-01-09 DIAGNOSIS — G40.909 NONINTRACTABLE EPILEPSY WITHOUT STATUS EPILEPTICUS, UNSPECIFIED EPILEPSY TYPE: ICD-10-CM

## 2024-01-09 DIAGNOSIS — I10 PRIMARY HYPERTENSION: ICD-10-CM

## 2024-01-09 DIAGNOSIS — E66.01 MORBID OBESITY: ICD-10-CM

## 2024-01-09 DIAGNOSIS — G93.2 BENIGN INTRACRANIAL HYPERTENSION: Primary | ICD-10-CM

## 2024-01-09 DIAGNOSIS — E03.9 HYPOTHYROIDISM, UNSPECIFIED TYPE: ICD-10-CM

## 2024-01-09 PROCEDURE — 4010F ACE/ARB THERAPY RXD/TAKEN: CPT | Mod: CPTII,,, | Performed by: NURSE PRACTITIONER

## 2024-01-09 PROCEDURE — 3078F DIAST BP <80 MM HG: CPT | Mod: CPTII,,, | Performed by: NURSE PRACTITIONER

## 2024-01-09 PROCEDURE — 99214 OFFICE O/P EST MOD 30 MIN: CPT | Mod: ,,, | Performed by: NURSE PRACTITIONER

## 2024-01-09 PROCEDURE — 3075F SYST BP GE 130 - 139MM HG: CPT | Mod: CPTII,,, | Performed by: NURSE PRACTITIONER

## 2024-01-09 PROCEDURE — 1160F RVW MEDS BY RX/DR IN RCRD: CPT | Mod: CPTII,,, | Performed by: NURSE PRACTITIONER

## 2024-01-09 PROCEDURE — 1159F MED LIST DOCD IN RCRD: CPT | Mod: CPTII,,, | Performed by: NURSE PRACTITIONER

## 2024-01-09 PROCEDURE — 3008F BODY MASS INDEX DOCD: CPT | Mod: CPTII,,, | Performed by: NURSE PRACTITIONER

## 2024-01-09 RX ORDER — PANTOPRAZOLE SODIUM 40 MG/1
40 TABLET, DELAYED RELEASE ORAL DAILY
Qty: 90 TABLET | Refills: 3 | Status: SHIPPED | OUTPATIENT
Start: 2024-01-09 | End: 2025-01-08

## 2024-01-09 RX ORDER — LEVOTHYROXINE SODIUM 125 UG/1
TABLET ORAL
Qty: 30 TABLET | Refills: 5 | Status: SHIPPED | OUTPATIENT
Start: 2024-01-09

## 2024-01-09 NOTE — ASSESSMENT & PLAN NOTE
Well controlled.   Continue current meds  Low Sodium Diet (DASH Diet - Less than 2 grams of sodium per day).  Monitor blood pressure daily and log. Report consistent numbers greater than 140/90.  Maintain healthy weight with goal BMI <30. Exercise 30 minutes per day, 5 days per week.  Smoking cessation encouraged to aid in BP reduction.

## 2024-01-09 NOTE — PROGRESS NOTES
"Patient ID: [id number]     Chief Complaint: Follow-up      HPI:     Ebony Richard is a 38 y.o. female here today for Follow-up    She reports that she's feeling like her normal self today other than a headache from her BIH lately.  She believes it to be from the weather/pressure change.  This has happened to her before.    Dr. Alaniz increased Lamictal some time ago.  She has not had a seizure in "a long time."  Lamictal level 12.  She has an appointment with a new neurologist in [place] in about 4 months.      She reports taking a lot of OTC meds while having COVID recently.  She started vomiting coffee ground emesis.  She reports that vomiting is normal for her but never looks like that.  She started taking OTC omeprazole.  Today she vomited but not coffee grounds.  She still has indigestion.  She reports this happened when she was in high school, had EGD and diagnosed with ulcers.     Past Medical History:  has a past medical history of Anxiety, Bipolar disorder, Epilepsy, unspecified, not intractable, with status epilepticus, GERD (gastroesophageal reflux disease), Hematochezia, Hyperlipidemia, Hypertension, Hypothyroidism, Long term use of drug, Loose stools, Migraines, Obesity, unspecified, and Schizoaffective disorder.    Surgical History:  has a past surgical history that includes Intrauterine device insertion (05/25/2022); Cholecystectomy; Tonsillectomy; and esophagesophagostomy.    Family History: family history includes Bipolar disorder in her cousin; Colon cancer in her maternal aunt; Depression in her brother; Diabetes type II in her father and mother; Hypertension in her father and mother; No Known Problems in her maternal grandmother, maternal uncle, paternal aunt, paternal grandfather, paternal grandmother, paternal uncle, sister, and another family member; Schizophrenia in her maternal grandfather.    Social History:  reports that she has been smoking cigarettes. She has been exposed to " "tobacco smoke. She has never used smokeless tobacco. She reports that she does not drink alcohol and does not use drugs.    Current Outpatient Medications   Medication Instructions    acetaZOLAMIDE (DIAMOX) 500 mg, Oral, 2 times daily    hydrOXYzine (ATARAX) 50 mg, Oral, 4 times daily PRN    lamoTRIgine (LAMICTAL) 1,000 mg, Oral, 2 times daily, 2 tablets(400mg) in am and 3(600mg) tablets in pm    LATUDA 40 mg, Oral, Nightly    levonorgestreL (MIRENA) 20 mcg/24 hours (8 yrs) 52 mg IUD 1 each, Intrauterine, Once    levothyroxine (SYNTHROID) 125 MCG tablet TAKE 1 TABLET EVERY MORNING ON AN EMPTY STOMACH FOR STOMACH FOR THYROID    losartan (COZAAR) 100 mg, Oral, FOR BLOOD PRESSURE    pantoprazole (PROTONIX) 40 mg, Oral, Daily    phenytoin (DILANTIN) 50 mg, Oral, Nightly    phenytoin (DILANTIN) 500 mg, Oral, Daily, 300mg po am-200mg po hs c 50mg for total of 250mg po hs    prochlorperazine (COMPAZINE) 10 mg, Oral, 2 times daily PRN    rizatriptan (MAXALT) 10 mg, Oral, Daily PRN    sertraline (ZOLOFT) 200 mg, Oral, Daily       Patient is allergic to codeine.     Patient Care Team:  Evelyn Chun FNP-JULISSA as PCP - General (Family Medicine)  Benji Alaniz MD (Neurology)  Saad Combs PMHNP as Nurse Practitioner (Psychiatry)  Brenda Heart NP as Nurse Practitioner (Obstetrics and Gynecology)       Subjective:     Review of Systems    See HPI     Objective:     Visit Vitals  /78 (BP Location: Left arm, Patient Position: Sitting, BP Method: Medium (Manual))   Pulse 88   Temp 97.7 °F (36.5 °C) (Temporal)   Ht 4' 9.99" (1.473 m)   Wt 124.8 kg (275 lb 3.2 oz)   SpO2 97%   BMI 57.53 kg/m²       Physical Exam  Vitals reviewed.   Constitutional:       Appearance: Normal appearance. She is morbidly obese.   Cardiovascular:      Rate and Rhythm: Normal rate and regular rhythm.      Heart sounds: Normal heart sounds.   Pulmonary:      Effort: Pulmonary effort is normal.      Breath sounds: " Normal breath sounds.   Skin:     General: Skin is warm and dry.   Neurological:      Mental Status: She is alert and oriented to person, place, and time.   Psychiatric:         Mood and Affect: Mood is anxious.         Behavior: Behavior normal.         Thought Content: Thought content normal.         Judgment: Judgment normal.         Labs Reviewed:     Chemistry:  Lab Results   Component Value Date     01/05/2024    K 4.3 01/05/2024    CHLORIDE 110 01/05/2024    BUN 20.0 01/05/2024    CREATININE 0.83 01/05/2024    EGFRNORACEVR >90 01/05/2024    GLUCOSE 108 01/05/2024    CALCIUM 9.0 01/05/2024    ALKPHOS 107 01/05/2024    LABPROT 7.4 01/05/2024    ALBUMIN 4.3 01/05/2024    AST 24 01/05/2024    ALT 20 01/05/2024    SIQCQIXV94ZB 34.0 07/05/2023    TSH 1.750 07/05/2023        Lab Results   Component Value Date    HGBA1C 5.2 07/05/2023        Hematology:  Lab Results   Component Value Date    WBC 9.26 01/05/2024    RBC 4.53 01/05/2024    HGB 13.0 01/05/2024    HCT 40.6 01/05/2024    MCV 89.6 01/05/2024    MCH 28.7 01/05/2024    MCHC 32.0 01/05/2024    RDW 13.9 01/05/2024     01/05/2024    MPV 9.0 (L) 01/05/2024       Lipid Panel:  Lab Results   Component Value Date    CHOL 186 07/05/2023    HDL 52 07/05/2023    LDL 61.5 03/25/2022    TRIG 126 07/05/2023        Assessment & Plan:     1. Benign intracranial hypertension  Overview:  On diamox 250 mg bid. Established with Dr. Alaniz, waiting for new neurology appointment.       2. Hypothyroidism, unspecified type  Overview:  On levothyroxine 125 mcg    Assessment & Plan:  Stable, continue meds.      3. Nonintractable epilepsy without status epilepticus, unspecified epilepsy type  Overview:  On lamotrigine 1000 mg bid, dilantin 500 mg daily      4. RAHEL (obstructive sleep apnea)  Overview:  Compliant with CPAP      5. Morbid obesity  Assessment & Plan:  Body mass index is 57.53 kg/m².  Goal BMI <30.  Exercise 5 times a week for 30 minutes per day.  Drink a  minimum of 64 ounces of water a day.  Avoid soda, simple sugars, excessive rice, potatoes, and bread. Limit fast foods and fried foods.  Choose complex carbs in moderation (example: green vegetables, beans, oatmeal). Eat plenty of fresh fruits and vegetables with lean meats daily.  Do not skip meals. Encouraged smaller portion sizes.  Avoid fad diets. Consider implementing sustainable healthy lifestyle changes.         6. Primary hypertension  Overview:  On losartan.    Assessment & Plan:  Well controlled.   Continue current meds  Low Sodium Diet (DASH Diet - Less than 2 grams of sodium per day).  Monitor blood pressure daily and log. Report consistent numbers greater than 140/90.  Maintain healthy weight with goal BMI <30. Exercise 30 minutes per day, 5 days per week.  Smoking cessation encouraged to aid in BP reduction.        7. PUD (peptic ulcer disease)  Overview:  First diagnosed when she was in high school by EGD.  No EGD since that time.     Assessment & Plan:  Start Protonix.  Referring for EGD.     Orders:  -     pantoprazole (PROTONIX) 40 MG tablet; Take 1 tablet (40 mg total) by mouth once daily.  Dispense: 90 tablet; Refill: 3  -     Ambulatory referral/consult to General Surgery; Future; Expected date: 01/16/2024       Follow up for 1), 4 mo f/u, Routine. In addition to their scheduled follow up, the patient has also been instructed to follow up on as needed basis.     Future Appointments   Date Time Provider Department Center   1/19/2024  2:00 PM Saad Combs PMHNP [facility] Radha [facility]   3/11/2024  2:00 PM Brenda Heart NP [facility]A Garcia OB   5/7/2024  1:15 PM Shannon Monge MD [facility] NEURO [place]   5/9/2024 10:00 AM Evelyn Chun FNP-C [facility] LOUISE [place]   7/16/2024  8:50 AM LAB, [facility] LABORATORY DRAW STATION [facility] WOLFGANG [place]   7/23/2024  8:30 AM [facility]

## 2024-01-09 NOTE — ASSESSMENT & PLAN NOTE
Body mass index is 57.53 kg/m².  Goal BMI <30.  Exercise 5 times a week for 30 minutes per day.  Drink a minimum of 64 ounces of water a day.  Avoid soda, simple sugars, excessive rice, potatoes, and bread. Limit fast foods and fried foods.  Choose complex carbs in moderation (example: green vegetables, beans, oatmeal). Eat plenty of fresh fruits and vegetables with lean meats daily.  Do not skip meals. Encouraged smaller portion sizes.  Avoid fad diets. Consider implementing sustainable healthy lifestyle changes.

## 2024-01-10 ENCOUNTER — PATIENT MESSAGE (OUTPATIENT)
Dept: FAMILY MEDICINE | Facility: CLINIC | Age: 39
End: 2024-01-10
Payer: MEDICAID

## 2024-01-19 ENCOUNTER — OFFICE VISIT (OUTPATIENT)
Dept: BEHAVIORAL HEALTH | Facility: CLINIC | Age: 39
End: 2024-01-19
Payer: MEDICAID

## 2024-01-19 VITALS — WEIGHT: 275.13 LBS | BODY MASS INDEX: 57.75 KG/M2 | HEIGHT: 58 IN

## 2024-01-19 DIAGNOSIS — F31.9 BIPOLAR I DISORDER: Primary | ICD-10-CM

## 2024-01-19 DIAGNOSIS — F44.5 CONVERSION DISORDER WITH ATTACKS OR SEIZURES: ICD-10-CM

## 2024-01-19 DIAGNOSIS — F41.1 GENERALIZED ANXIETY DISORDER: ICD-10-CM

## 2024-01-19 PROCEDURE — 3008F BODY MASS INDEX DOCD: CPT | Mod: CPTII,95,, | Performed by: NURSE PRACTITIONER

## 2024-01-19 PROCEDURE — 99214 OFFICE O/P EST MOD 30 MIN: CPT | Mod: 95,,, | Performed by: NURSE PRACTITIONER

## 2024-01-19 PROCEDURE — 1159F MED LIST DOCD IN RCRD: CPT | Mod: CPTII,95,, | Performed by: NURSE PRACTITIONER

## 2024-01-19 PROCEDURE — 1160F RVW MEDS BY RX/DR IN RCRD: CPT | Mod: CPTII,95,, | Performed by: NURSE PRACTITIONER

## 2024-01-19 PROCEDURE — 4010F ACE/ARB THERAPY RXD/TAKEN: CPT | Mod: CPTII,95,, | Performed by: NURSE PRACTITIONER

## 2024-01-19 RX ORDER — HYDROXYZINE HYDROCHLORIDE 50 MG/1
50 TABLET, FILM COATED ORAL 4 TIMES DAILY PRN
Qty: 120 TABLET | Refills: 1 | Status: SHIPPED | OUTPATIENT
Start: 2024-01-19 | End: 2024-03-21 | Stop reason: SDUPTHER

## 2024-01-19 RX ORDER — SERTRALINE HYDROCHLORIDE 100 MG/1
200 TABLET, FILM COATED ORAL DAILY
Qty: 60 TABLET | Refills: 1 | Status: SHIPPED | OUTPATIENT
Start: 2024-01-19 | End: 2024-03-21 | Stop reason: SDUPTHER

## 2024-01-19 RX ORDER — LURASIDONE HYDROCHLORIDE 60 MG/1
60 TABLET, FILM COATED ORAL DAILY
Qty: 30 TABLET | Refills: 0 | Status: SHIPPED | OUTPATIENT
Start: 2024-01-19 | End: 2024-02-26

## 2024-01-19 NOTE — PROGRESS NOTES
"PSYCHIATRIC FOLLOW-UP VISIT NOTE    Chief Complaint   Patient presents with    Medication Refill     2 month F/U medication         History of Present Illness  38 y.o. year old White female with hx of bipolar disorder, ELIZA, and conversion disorder seen today for follow-up appointment and medication management.  Patient reports that since November her stress level has been very elevated.  States her mother and father having significant marital discord and maybe .  She lives next door to them but spends a lot of time with the home.  States things has been very tense there and she finds it difficult to manage her anxiety at this time.  Has had some mood lability.  Also lost her dog overnight last week but was able to find him the next day.  Since then she has been very concerned about him getting away from her again.  During this time she is resumed smoking in his averaging about 5 cigarettes per day.  Has also been more isolative and withdrawn.  Adamantly denies any thoughts of harming self or others.  We discussed treatment options and agreed to increase her Latuda to 60 mg today in lieu of adding any additional medications.  We will re-evaluate efficacy in approximately 4 weeks. Patient denies SI/HI. Denies hallucinations and does not appear to be responding to internal stimuli or be internally preoccupied. No manic symptoms noted.       Objective:     Vitals:  Vitals:    01/19/24 1354   Weight: 124.8 kg (275 lb 2.2 oz)   Height: 4' 9.99" (1.473 m)       Wt Readings from Last 3 Encounters:   01/19/24 1354 124.8 kg (275 lb 2.2 oz)   01/09/24 1104 124.8 kg (275 lb 3.2 oz)   10/30/23 1202 124.7 kg (274 lb 14.6 oz)         Medication:    Current Outpatient Medications:     acetaZOLAMIDE (DIAMOX) 250 MG tablet, Take 500 mg by mouth 2 (two) times daily., Disp: , Rfl:     lamoTRIgine (LAMICTAL) 200 MG tablet, Take 1,000 mg by mouth 2 (two) times a day. 2 tablets(400mg) in am and 3(600mg) tablets in pm, Disp: , " Rfl:     levonorgestreL (MIRENA) 20 mcg/24 hours (8 yrs) 52 mg IUD, 1 each by Intrauterine route once., Disp: , Rfl:     levothyroxine (SYNTHROID) 125 MCG tablet, TAKE 1 TABLET EVERY MORNING ON AN EMPTY STOMACH FOR STOMACH FOR THYROID, Disp: 30 tablet, Rfl: 5    losartan (COZAAR) 100 MG tablet, TAKE 1 TABLET DAILY FOR BLOOD PRESSURE, Disp: 90 tablet, Rfl: 0    pantoprazole (PROTONIX) 40 MG tablet, Take 1 tablet (40 mg total) by mouth once daily., Disp: 90 tablet, Rfl: 3    phenytoin (DILANTIN) 100 MG ER capsule, Take 500 mg by mouth once daily. 300mg po am-200mg po hs c 50mg for total of 250mg po hs, Disp: , Rfl:     phenytoin (DILANTIN) 50 mg chewable tablet, Take 50 mg by mouth every evening., Disp: , Rfl:     prochlorperazine (COMPAZINE) 10 MG tablet, Take 10 mg by mouth 2 (two) times daily as needed., Disp: , Rfl:     rizatriptan (MAXALT) 10 MG tablet, Take 10 mg by mouth daily as needed., Disp: , Rfl:     hydrOXYzine (ATARAX) 50 MG tablet, Take 1 tablet (50 mg total) by mouth 4 (four) times daily as needed for Itching., Disp: 120 tablet, Rfl: 1    lurasidone (LATUDA) 60 mg Tab tablet, Take 1 tablet (60 mg total) by mouth once daily., Disp: 30 tablet, Rfl: 0    sertraline (ZOLOFT) 100 MG tablet, Take 2 tablets (200 mg total) by mouth once daily., Disp: 60 tablet, Rfl: 1       Significant Labs: - none at this time    Significant Imaging: - none at this time    Physical Exam  Vitals and nursing note reviewed.   Constitutional:       General: She is awake.      Appearance: Normal appearance.   Musculoskeletal:      Comments: deferred   Neurological:      Mental Status: She is alert.   Psychiatric:         Attention and Perception: Attention and perception normal. She does not perceive auditory or visual hallucinations.         Mood and Affect: Affect normal. Mood is anxious.         Speech: Speech normal.         Behavior: Behavior is cooperative.         Thought Content: Thought content does not include homicidal  "or suicidal ideation.         Cognition and Memory: Cognition and memory normal.          Review of Systems     Mental Status Exam:  Presentation:  - Appearance: 38 y.o. year old White female, appears stated age, appears Casually dressed and Well groomed  - Motility: No EPS or Tremors, No psychomotor agitation or retardation appreciated  - Behavior: anxious, cooperative, maintains eye contact  Speech:  - Character/Organization: spontaneous, deliberate, pressured  Emotional State:  - Mood: "worried"   - Affect: congruent and anxious  Thought:  - Process: logical, linear, organized , goal-directed  - Preoccupations: family  - Delusions: no persecutory, paranoid, or grandiose delusions appreciated  - Perception: denies AVH, not actively responding to internal stimuli  - SI/HI: denies/denies  Sensorium & Intellect:  - Sensorium: AAOx4  - Memory: intact to recent and remote events  - Attention/Concentration: good/good  - Insight/Judgement: good/good    Gait: deferred   MSK:deferred     All other systems without acute issues unless noted in HPI      Assessment/Plan      ICD-10-CM ICD-9-CM    1. Bipolar I disorder  F31.9 296.7 lurasidone (LATUDA) 60 mg Tab tablet      2. Generalized anxiety disorder  F41.1 300.02 hydrOXYzine (ATARAX) 50 MG tablet      sertraline (ZOLOFT) 100 MG tablet      3. Conversion disorder with attacks or seizures  F44.5 300.11          Increase Latuda to 60 mg daily    Continue other medications without change    Potential side effects and risks vs benefits of current treatment plan reviewed with patient. Applicable black box warnings reviewed. Encouraged patient not to alter dosages or abruptly discontinue medications without contacting prescriber first, due to risk of worsening symptoms and decompensation of mental status. Warned of risks associated with herbal remedies and supplements while taking psychotropic medications and of the need to consult prescriber prior to adding any of these to " current regimen. Patient should abstain from abuse of alcohol, prescription medications, and illicit drugs. Reviewed when to contact clinic and/or seek emergent care, such as but not limited to, onset/worsening SI/HI, hallucinations, delusions, manic symptoms. Pt verbalized understanding and agreement of these warnings/recommendations and verbally consented to treatment plan.    The patient was informed of the restrictions of face-to-face healthcare visits. The patient verbally consented to proceed with a telemedicine visit, following discussion of the options of face-to-face or telemedicine visits. The telemedicine visit was completed using EPIC Video call without complication. The visit was conducted with limited physical exam and was medically appropriate to meet the patient's needs. Duration of call: 15 minutes      Follow up in about 4 weeks (around 2/16/2024) for Medication Management.    CARMELLA Thapa

## 2024-02-05 ENCOUNTER — TELEPHONE (OUTPATIENT)
Dept: FAMILY MEDICINE | Facility: CLINIC | Age: 39
End: 2024-02-05
Payer: MEDICAID

## 2024-02-05 DIAGNOSIS — I10 HYPERTENSION, UNSPECIFIED TYPE: ICD-10-CM

## 2024-02-05 RX ORDER — LOSARTAN POTASSIUM 100 MG/1
100 TABLET ORAL DAILY
Qty: 90 TABLET | Refills: 3 | Status: SHIPPED | OUTPATIENT
Start: 2024-02-05

## 2024-02-07 ENCOUNTER — HOSPITAL ENCOUNTER (OUTPATIENT)
Dept: RADIOLOGY | Facility: HOSPITAL | Age: 39
Discharge: HOME | End: 2024-02-07
Attending: SURGERY
Payer: MEDICAID

## 2024-02-07 ENCOUNTER — HOSPITAL ENCOUNTER (OUTPATIENT)
Dept: PREADMISSION TESTING | Facility: HOSPITAL | Age: 39
Discharge: HOME | End: 2024-02-07
Attending: SURGERY
Payer: MEDICAID

## 2024-02-07 VITALS — WEIGHT: 284 LBS | BODY MASS INDEX: 59.61 KG/M2 | HEIGHT: 58 IN

## 2024-02-07 DIAGNOSIS — K21.9 GERD (GASTROESOPHAGEAL REFLUX DISEASE): ICD-10-CM

## 2024-02-07 DIAGNOSIS — K92.0 HEMATEMESIS: ICD-10-CM

## 2024-02-07 DIAGNOSIS — K21.9 GASTROESOPHAGEAL REFLUX DISEASE, UNSPECIFIED WHETHER ESOPHAGITIS PRESENT: Primary | ICD-10-CM

## 2024-02-07 PROCEDURE — A9698 NON-RAD CONTRAST MATERIALNOC: HCPCS | Performed by: SURGERY

## 2024-02-07 PROCEDURE — 25500020 PHARM REV CODE 255: Performed by: SURGERY

## 2024-02-07 PROCEDURE — 74246 X-RAY XM UPR GI TRC 2CNTRST: CPT | Mod: TC

## 2024-02-07 PROCEDURE — 74250 X-RAY XM SM INT 1CNTRST STD: CPT | Mod: TC

## 2024-02-07 RX ORDER — SODIUM CHLORIDE, SODIUM LACTATE, POTASSIUM CHLORIDE, CALCIUM CHLORIDE 600; 310; 30; 20 MG/100ML; MG/100ML; MG/100ML; MG/100ML
INJECTION, SOLUTION INTRAVENOUS CONTINUOUS
Status: CANCELLED | OUTPATIENT
Start: 2024-02-15

## 2024-02-07 RX ORDER — GLYCOPYRROLATE 0.2 MG/ML
0.2 INJECTION INTRAMUSCULAR; INTRAVENOUS ONCE
Status: CANCELLED | OUTPATIENT
Start: 2024-02-15

## 2024-02-07 RX ORDER — CETIRIZINE HYDROCHLORIDE 10 MG/1
10 TABLET ORAL DAILY
COMMUNITY

## 2024-02-07 RX ADMIN — BARIUM SULFATE 140 ML: 980 POWDER, FOR SUSPENSION ORAL at 06:02

## 2024-02-07 NOTE — DISCHARGE INSTRUCTIONS

## 2024-02-13 ENCOUNTER — ANESTHESIA EVENT (OUTPATIENT)
Dept: GASTROENTEROLOGY | Facility: HOSPITAL | Age: 39
End: 2024-02-13
Payer: MEDICAID

## 2024-02-13 NOTE — ANESTHESIA PREPROCEDURE EVALUATION
02/13/2024  Ebony Richard is a 38 y.o., female.  nesthesia History    No specialty history recorded    Other Medical History   Anxiety Bipolar disorder   GERD (gastroesophageal reflux disease) Hyperlipidemia   Hypertension Hypothyroidism   Epilepsy, unspecified, not intractable, with status epilepticus Hematochezia   Long term use of drug Loose stools   Migraines Obesity, unspecified   Schizoaffective disorder Intracranial hypertension     Surgical History    INTRAUTERINE DEVICE INSERTION CHOLECYSTECTOMY   TONSILLECTOMY esophagesophagostomy     COVID-19 Risk of Complications  Current as of 2 days ago (Sunday)    2 0 - 3 Points: Low Risk   3 - 6 Points: Medium Risk   6 - 16 Points: High Risk     Last Change:       Details   Substance History    Smoking Status: Every Day   Passive Exposure: Past   Smokeless Tobacco Status: Never   Alcohol use: Never   Drug use: Never     Anesthesia Family History    No history of this type found      Current Gender Identity    Questions Responses   Patient's Gender Identity: Female   Patient's sex assigned at birth: Female          Pre-op Assessment    I have reviewed the Patient Summary Reports.     I have reviewed the Nursing Notes. I have reviewed the NPO Status.   I have reviewed the Medications.     Review of Systems  Anesthesia Hx:  No problems with previous Anesthesia             Denies Family Hx of Anesthesia complications.    Denies Personal Hx of Anesthesia complications.                    Social:  Smoker       Hematology/Oncology:  Hematology Normal   Oncology Normal                                   EENT/Dental:  EENT/Dental Normal           Cardiovascular:  Exercise tolerance: poor   Hypertension           hyperlipidemia                             Pulmonary:        Sleep Apnea                Renal/:  Renal/ Normal                 Hepatic/GI:   PUD,  GERD              Musculoskeletal:  Musculoskeletal Normal                Neurological:      Headaches Seizures    Intracranial HTN                            Endocrine:   Hypothyroidism        Morbid Obesity / BMI > 40  Dermatological:  Skin Normal    Psych:  Psychiatric History anxiety  Schizophrenia  Bipolar               Physical Exam  General: Well nourished, Cooperative, Alert and Oriented    Airway:  Mallampati: II / II  Mouth Opening: Normal  TM Distance: Normal  Tongue: Normal  Neck ROM: Normal ROM    Dental:  Intact        Anesthesia Plan  Type of Anesthesia, risks & benefits discussed:    Anesthesia Type: MAC  Intra-op Monitoring Plan: Standard ASA Monitors  Post Op Pain Control Plan: multimodal analgesia  Induction:  IV  Informed Consent: Informed consent signed with the Patient and all parties understand the risks and agree with anesthesia plan.  All questions answered. Patient consented to blood products? Yes  ASA Score: 4  Day of Surgery Review of History & Physical: H&P Update referred to the surgeon/provider.I have interviewed and examined the patient. I have reviewed the patient's H&P dated: There are no significant changes.     Ready For Surgery From Anesthesia Perspective.     .

## 2024-02-15 ENCOUNTER — ANESTHESIA (OUTPATIENT)
Dept: GASTROENTEROLOGY | Facility: HOSPITAL | Age: 39
End: 2024-02-15
Payer: MEDICAID

## 2024-02-15 ENCOUNTER — HOSPITAL ENCOUNTER (OUTPATIENT)
Dept: GASTROENTEROLOGY | Facility: HOSPITAL | Age: 39
Discharge: HOME | End: 2024-02-15
Attending: SURGERY
Payer: MEDICAID

## 2024-02-15 DIAGNOSIS — K21.9 GERD (GASTROESOPHAGEAL REFLUX DISEASE): Primary | ICD-10-CM

## 2024-02-15 DIAGNOSIS — K92.0 HEMATEMESIS, UNSPECIFIED WHETHER NAUSEA PRESENT: ICD-10-CM

## 2024-02-15 DIAGNOSIS — K21.9 GASTROESOPHAGEAL REFLUX DISEASE, UNSPECIFIED WHETHER ESOPHAGITIS PRESENT: ICD-10-CM

## 2024-02-15 LAB — B-HCG SERPL QL: NEGATIVE

## 2024-02-15 PROCEDURE — 25000003 PHARM REV CODE 250: Performed by: NURSE ANESTHETIST, CERTIFIED REGISTERED

## 2024-02-15 PROCEDURE — 63600175 PHARM REV CODE 636 W HCPCS: Performed by: SURGERY

## 2024-02-15 PROCEDURE — 37000008 HC ANESTHESIA 1ST 15 MINUTES

## 2024-02-15 PROCEDURE — 27201423 OPTIME MED/SURG SUP & DEVICES STERILE SUPPLY

## 2024-02-15 PROCEDURE — 81025 URINE PREGNANCY TEST: CPT | Performed by: SURGERY

## 2024-02-15 PROCEDURE — D9220A PRA ANESTHESIA: Mod: ,,, | Performed by: NURSE ANESTHETIST, CERTIFIED REGISTERED

## 2024-02-15 PROCEDURE — 63600175 PHARM REV CODE 636 W HCPCS: Performed by: NURSE ANESTHETIST, CERTIFIED REGISTERED

## 2024-02-15 PROCEDURE — 43235 EGD DIAGNOSTIC BRUSH WASH: CPT | Performed by: SURGERY

## 2024-02-15 RX ORDER — LIDOCAINE HYDROCHLORIDE 20 MG/ML
INJECTION INTRAVENOUS
Status: DISCONTINUED | OUTPATIENT
Start: 2024-02-15 | End: 2024-02-15

## 2024-02-15 RX ORDER — SODIUM CHLORIDE 9 MG/ML
INJECTION, SOLUTION INTRAVENOUS CONTINUOUS
Status: DISCONTINUED | OUTPATIENT
Start: 2024-02-15 | End: 2024-02-16 | Stop reason: HOSPADM

## 2024-02-15 RX ORDER — SODIUM CHLORIDE, SODIUM LACTATE, POTASSIUM CHLORIDE, CALCIUM CHLORIDE 600; 310; 30; 20 MG/100ML; MG/100ML; MG/100ML; MG/100ML
INJECTION, SOLUTION INTRAVENOUS CONTINUOUS
Status: DISCONTINUED | OUTPATIENT
Start: 2024-02-15 | End: 2024-02-16 | Stop reason: HOSPADM

## 2024-02-15 RX ORDER — GLYCOPYRROLATE 0.2 MG/ML
0.2 INJECTION INTRAMUSCULAR; INTRAVENOUS ONCE
Status: COMPLETED | OUTPATIENT
Start: 2024-02-15 | End: 2024-02-15

## 2024-02-15 RX ORDER — PROPOFOL 10 MG/ML
VIAL (ML) INTRAVENOUS
Status: DISCONTINUED | OUTPATIENT
Start: 2024-02-15 | End: 2024-02-15

## 2024-02-15 RX ADMIN — PROPOFOL 50 MG: 10 INJECTION, EMULSION INTRAVENOUS at 12:02

## 2024-02-15 RX ADMIN — LIDOCAINE HYDROCHLORIDE 50 MG: 20 INJECTION, SOLUTION INTRAVENOUS at 12:02

## 2024-02-15 RX ADMIN — PROPOFOL 70 MG: 10 INJECTION, EMULSION INTRAVENOUS at 12:02

## 2024-02-15 RX ADMIN — SODIUM CHLORIDE, POTASSIUM CHLORIDE, SODIUM LACTATE AND CALCIUM CHLORIDE: 600; 310; 30; 20 INJECTION, SOLUTION INTRAVENOUS at 11:02

## 2024-02-15 RX ADMIN — GLYCOPYRROLATE 0.2 MG: 0.2 INJECTION INTRAMUSCULAR; INTRAVENOUS at 11:02

## 2024-02-15 NOTE — ANESTHESIA POSTPROCEDURE EVALUATION
Anesthesia Post Evaluation    Patient: Ebony Richard    Procedure(s) Performed: * No procedures listed *    Final Anesthesia Type: MAC      Patient location during evaluation: OPS  Patient participation: Yes- Able to Participate  Level of consciousness: awake and alert, awake and oriented  Post-procedure vital signs: reviewed and stable  Pain management: adequate  Airway patency: patent    PONV status at discharge: No PONV  Anesthetic complications: no      Cardiovascular status: blood pressure returned to baseline  Respiratory status: unassisted, room air and spontaneous ventilation  Hydration status: euvolemic  Follow-up not needed.              Vitals Value Taken Time   /79 02/15/24 1015   Temp 36.5 °C (97.7 °F) 02/15/24 1015   Pulse 70 02/15/24 1015   Resp 20 02/15/24 1015   SpO2 99 % 02/15/24 1015         No case tracking events are documented in the log.      Pain/Janeth Score: No data recorded

## 2024-02-15 NOTE — DISCHARGE INSTRUCTIONS
Follow-up with Dr. Noland  Diet:  as tolerated  Activity:  decrease activity today, no driving today, resume all activity tomorrow  Notify MD:  increased swelling of abdomen, difficulty breathing/swallowing, excessive nausea/vomiting, excessive bright red bleeding from mouth/vomit,  Medications:  continue your home medications, keep a list of your home medications at all times for emergencies.

## 2024-02-15 NOTE — PLAN OF CARE
Pt is stable and ready for discharge. Pt offered wheelchair, declined. Pt ambulated out of dept with family at side.

## 2024-02-19 VITALS
OXYGEN SATURATION: 100 % | DIASTOLIC BLOOD PRESSURE: 88 MMHG | WEIGHT: 284 LBS | HEIGHT: 58 IN | RESPIRATION RATE: 20 BRPM | SYSTOLIC BLOOD PRESSURE: 149 MMHG | TEMPERATURE: 98 F | BODY MASS INDEX: 59.61 KG/M2 | HEART RATE: 70 BPM

## 2024-02-22 NOTE — DISCHARGE SUMMARY
[facility]  Discharge Note  Short Stay    EGD      OUTCOME: Patient tolerated treatment/procedure well without complication and is now ready for discharge.    DISPOSITION: Home or Self Care    FINAL DIAGNOSIS:  GERD (gastroesophageal reflux disease)    FOLLOWUP: In clinic    DISCHARGE INSTRUCTIONS:    Discharge Procedure Orders   Diet general         Clinical Reference Documents Added to Patient Instructions         Document    [facility] DISCHARGE INSTRUCTIONS (ENGLISH)            TIME SPENT ON DISCHARGE: 5 minutes

## 2024-02-26 ENCOUNTER — TELEPHONE (OUTPATIENT)
Dept: FAMILY MEDICINE | Facility: CLINIC | Age: 39
End: 2024-02-26
Payer: MEDICAID

## 2024-02-26 DIAGNOSIS — F31.9 BIPOLAR I DISORDER: ICD-10-CM

## 2024-02-26 RX ORDER — LURASIDONE HYDROCHLORIDE 60 MG/1
60 TABLET, FILM COATED ORAL
Qty: 30 TABLET | Refills: 0 | Status: SHIPPED | OUTPATIENT
Start: 2024-02-26 | End: 2024-02-27 | Stop reason: SDUPTHER

## 2024-02-27 ENCOUNTER — TELEPHONE (OUTPATIENT)
Dept: BEHAVIORAL HEALTH | Facility: CLINIC | Age: 39
End: 2024-02-27
Payer: MEDICAID

## 2024-02-27 DIAGNOSIS — F31.9 BIPOLAR I DISORDER: ICD-10-CM

## 2024-02-27 RX ORDER — LURASIDONE HYDROCHLORIDE 60 MG/1
60 TABLET, FILM COATED ORAL DAILY
Qty: 30 TABLET | Refills: 1 | Status: SHIPPED | OUTPATIENT
Start: 2024-02-27 | End: 2024-03-21 | Stop reason: SDUPTHER

## 2024-03-21 ENCOUNTER — OFFICE VISIT (OUTPATIENT)
Dept: BEHAVIORAL HEALTH | Facility: CLINIC | Age: 39
End: 2024-03-21
Payer: MEDICAID

## 2024-03-21 VITALS
HEART RATE: 77 BPM | OXYGEN SATURATION: 100 % | SYSTOLIC BLOOD PRESSURE: 124 MMHG | TEMPERATURE: 98 F | WEIGHT: 284.81 LBS | HEIGHT: 58 IN | BODY MASS INDEX: 59.79 KG/M2 | DIASTOLIC BLOOD PRESSURE: 66 MMHG

## 2024-03-21 DIAGNOSIS — F44.5 CONVERSION DISORDER WITH ATTACKS OR SEIZURES: ICD-10-CM

## 2024-03-21 DIAGNOSIS — F41.1 GENERALIZED ANXIETY DISORDER: ICD-10-CM

## 2024-03-21 DIAGNOSIS — F31.9 BIPOLAR I DISORDER: Primary | ICD-10-CM

## 2024-03-21 PROCEDURE — 99214 OFFICE O/P EST MOD 30 MIN: CPT | Mod: ,,, | Performed by: NURSE PRACTITIONER

## 2024-03-21 PROCEDURE — 1159F MED LIST DOCD IN RCRD: CPT | Mod: CPTII,,, | Performed by: NURSE PRACTITIONER

## 2024-03-21 PROCEDURE — 3074F SYST BP LT 130 MM HG: CPT | Mod: CPTII,,, | Performed by: NURSE PRACTITIONER

## 2024-03-21 PROCEDURE — 1160F RVW MEDS BY RX/DR IN RCRD: CPT | Mod: CPTII,,, | Performed by: NURSE PRACTITIONER

## 2024-03-21 PROCEDURE — 3078F DIAST BP <80 MM HG: CPT | Mod: CPTII,,, | Performed by: NURSE PRACTITIONER

## 2024-03-21 PROCEDURE — 3008F BODY MASS INDEX DOCD: CPT | Mod: CPTII,,, | Performed by: NURSE PRACTITIONER

## 2024-03-21 PROCEDURE — 4010F ACE/ARB THERAPY RXD/TAKEN: CPT | Mod: CPTII,,, | Performed by: NURSE PRACTITIONER

## 2024-03-21 RX ORDER — LURASIDONE HYDROCHLORIDE 60 MG/1
60 TABLET, FILM COATED ORAL DAILY
Qty: 30 TABLET | Refills: 1 | Status: SHIPPED | OUTPATIENT
Start: 2024-03-21 | End: 2024-05-21 | Stop reason: SDUPTHER

## 2024-03-21 RX ORDER — SERTRALINE HYDROCHLORIDE 100 MG/1
200 TABLET, FILM COATED ORAL DAILY
Qty: 60 TABLET | Refills: 1 | Status: SHIPPED | OUTPATIENT
Start: 2024-03-21 | End: 2024-05-21 | Stop reason: SDUPTHER

## 2024-03-21 RX ORDER — HYDROXYZINE HYDROCHLORIDE 50 MG/1
50 TABLET, FILM COATED ORAL 4 TIMES DAILY PRN
Qty: 120 TABLET | Refills: 1 | Status: SHIPPED | OUTPATIENT
Start: 2024-03-21 | End: 2024-05-21 | Stop reason: SDUPTHER

## 2024-03-21 NOTE — PROGRESS NOTES
"PSYCHIATRIC FOLLOW-UP VISIT NOTE    Chief Complaint   Patient presents with    Medication Refill     Medication management         History of Present Illness  38 y.o. year old White female with hx of bipolar disorder, ELIZA, and conversion disorder seen today for follow-up appointment and medication management.  Patient reports that she has been doing well since last visit.  Denies any recent depression.  Anxiety has been mild.  Feels that since increasing Latuda dose at last visit mood has been more stable.  Less easily overwhelmed.  No increased irritability or impulsivity.  She is sleeping well at night and feels rested in the morning.  Also states that her parents are having less discord in the relationship, which has led patient's anxiety level to lessen as well.  Denies any side effects from current medication regimen. Patient denies SI/HI. Denies hallucinations and does not appear to be responding to internal stimuli or be internally preoccupied. No manic symptoms noted. Tolerating SGA therapy without serious side effects. No NMS s/s. No EPS or TD symptoms noted. AIMS=0.        Objective:     Vitals:  Vitals:    03/21/24 0953   BP: 124/66   BP Location: Right arm   Patient Position: Sitting   BP Method: Large (Manual)   Pulse: 77   Temp: 98.1 °F (36.7 °C)   TempSrc: Temporal   SpO2: 100%   Weight: 129.2 kg (284 lb 13.4 oz)   Height: 4' 9.99" (1.473 m)       Wt Readings from Last 3 Encounters:   03/21/24 0953 129.2 kg (284 lb 13.4 oz)   02/15/24 1008 128.8 kg (284 lb)   02/07/24 0912 128.8 kg (284 lb)         Medication:    Current Outpatient Medications:     acetaZOLAMIDE (DIAMOX) 250 MG tablet, Take 500 mg by mouth 2 (two) times daily., Disp: , Rfl:     cetirizine (ZYRTEC) 10 MG tablet, Take 10 mg by mouth once daily., Disp: , Rfl:     lamoTRIgine (LAMICTAL) 200 MG tablet, Take 1,000 mg by mouth 2 (two) times a day. 2 tablets(400mg) in am and 3(600mg) tablets in pm, Disp: , Rfl:     levonorgestreL (MIRENA) 20 " mcg/24 hours (8 yrs) 52 mg IUD, 1 each by Intrauterine route once., Disp: , Rfl:     levothyroxine (SYNTHROID) 125 MCG tablet, TAKE 1 TABLET EVERY MORNING ON AN EMPTY STOMACH FOR STOMACH FOR THYROID, Disp: 30 tablet, Rfl: 5    losartan (COZAAR) 100 MG tablet, Take 1 tablet (100 mg total) by mouth once daily. FOR BLOOD PRESSURE, Disp: 90 tablet, Rfl: 3    pantoprazole (PROTONIX) 40 MG tablet, Take 1 tablet (40 mg total) by mouth once daily., Disp: 90 tablet, Rfl: 3    phenytoin (DILANTIN) 100 MG ER capsule, Take 500 mg by mouth once daily. 300mg po am-200mg po hs c 50mg for total of 250mg po hs, Disp: , Rfl:     phenytoin (DILANTIN) 50 mg chewable tablet, Take 50 mg by mouth every evening., Disp: , Rfl:     hydrOXYzine (ATARAX) 50 MG tablet, Take 1 tablet (50 mg total) by mouth 4 (four) times daily as needed for Itching., Disp: 120 tablet, Rfl: 1    lurasidone (LATUDA) 60 mg Tab tablet, Take 1 tablet (60 mg total) by mouth once daily., Disp: 30 tablet, Rfl: 1    prochlorperazine (COMPAZINE) 10 MG tablet, Take 10 mg by mouth 2 (two) times daily as needed., Disp: , Rfl:     rizatriptan (MAXALT) 10 MG tablet, Take 10 mg by mouth daily as needed., Disp: , Rfl:     sertraline (ZOLOFT) 100 MG tablet, Take 2 tablets (200 mg total) by mouth once daily., Disp: 60 tablet, Rfl: 1       Significant Labs: - none at this time    Significant Imaging: - none at this time    Physical Exam  Vitals and nursing note reviewed.   Constitutional:       General: She is awake.      Appearance: Normal appearance.   Musculoskeletal:      Comments: Full ROM   Neurological:      Mental Status: She is alert.   Psychiatric:         Attention and Perception: Attention and perception normal. She does not perceive auditory or visual hallucinations.         Mood and Affect: Affect normal. Mood is anxious.         Speech: Speech normal.         Behavior: Behavior is cooperative.         Thought Content: Thought content does not include homicidal or  "suicidal ideation.         Cognition and Memory: Cognition and memory normal.         Judgment: Judgment normal.          Review of Systems     Mental Status Exam:  Presentation:  - Appearance: 38 y.o. year old White female, appears stated age, appears Casually dressed and Well groomed  - Motility: Erect when standing, Steady gait, No EPS or Tremors, No psychomotor agitation or retardation appreciated  - Behavior: calm, cooperative, good eye contact  Speech:  - Character/Organization: spontaneous, fluent, normal volume, normal rate, normal rhythm  Emotional State:  - Mood: "anxious"   - Affect: congruent and appropriate  Thought:  - Process: logical, linear, organized , goal-directed  - Preoccupations: no ruminations, rituals, or phobias appreciated  - Delusions: no persecutory, paranoid, or grandiose delusions appreciated  - Perception: denies AVH, not actively responding to internal stimuli  - SI/HI: denies/denies  Sensorium & Intellect:  - Sensorium: AAOx4  - Memory: intact to recent and remote events  - Attention/Concentration: good/good  - Insight/Judgement: good/good    Gait: normal swing and stance  MSK:no rigidity appreciated    All other systems without acute issues unless noted in HPI      Assessment/Plan      ICD-10-CM ICD-9-CM    1. Bipolar I disorder  F31.9 296.7 lurasidone (LATUDA) 60 mg Tab tablet      2. Generalized anxiety disorder  F41.1 300.02 hydrOXYzine (ATARAX) 50 MG tablet      sertraline (ZOLOFT) 100 MG tablet      3. Conversion disorder with attacks or seizures  F44.5 300.11          Continue current medications without change    Potential side effects and risks vs benefits of current treatment plan reviewed with patient. Applicable black box warnings reviewed. Encouraged patient not to alter dosages or abruptly discontinue medications without contacting prescriber first, due to risk of worsening symptoms and decompensation of mental status. Warned of risks associated with herbal remedies " and supplements while taking psychotropic medications and of the need to consult prescriber prior to adding any of these to current regimen. Patient should abstain from abuse of alcohol, prescription medications, and illicit drugs. Reviewed when to contact clinic and/or seek emergent care, such as but not limited to, onset/worsening SI/HI, hallucinations, delusions, manic symptoms. Pt verbalized understanding and agreement of these warnings/recommendations and verbally consented to treatment plan.      Follow up in about 2 months (around 5/21/2024) for Medication Management.    Saad Combs, PMMOHITP

## 2024-04-10 NOTE — PROGRESS NOTES
Chief Complaint: Annual exam    Chief Complaint   Patient presents with    Annual Exam     PAP 3/7/2023 WNL, Mirena 2022, Akshat 3/3, Denies problems today        HPI:   38 y.o. F  presents for an annual gyn exam. Currently has Mirena IUD, inserted 2022, for contraceptive use.   Denies complaints today.    FmHx: negative for breast, uterine, ovarian, and colon cancers.     Labs / Significant Studies:  LMP: NA due to Mirena   Frequency: NA  Cycle Length: NA   Flow: NA  Intermenstrual Bleeding: No  Postcoital Bleeding: No  Dysmenorrhea: No  Dyspareunia: No  Sexually Active: No   Contraception: Mirena    Hx of STI: No   Last PAP: 3/07/2023 WNL HPV Negative  H/o Abnormal Pap: No    Mirena Inserted 5/    Family History   Problem Relation Name Age of Onset    No Known Problems Paternal Grandfather      No Known Problems Paternal Grandmother      No Known Problems Maternal Grandmother      Schizophrenia Maternal Grandfather      Diabetes type II Father      Hypertension Father      Diabetes type II Mother      Hypertension Mother      Depression Brother      No Known Problems Sister      Colon cancer Maternal Aunt      Colon cancer Maternal Aunt      No Known Problems Maternal Uncle      No Known Problems Paternal Aunt      No Known Problems Paternal Uncle      Bipolar disorder Cousin      No Known Problems Other      Breast cancer Neg Hx      Cervical cancer Neg Hx      Uterine cancer Neg Hx      Ovarian cancer Neg Hx           Past Medical History:   Diagnosis Date    Anxiety     Bipolar disorder     Epilepsy, unspecified, not intractable, with status epilepticus     GERD (gastroesophageal reflux disease)     Hematochezia     Hyperlipidemia     Hypertension     Hypothyroidism     Intracranial hypertension     Long term use of drug     Loose stools     Migraines     Obesity, unspecified     Schizoaffective disorder      Past Surgical History:   Procedure Laterality Date    CHOLECYSTECTOMY       esophagesophagostomy      INTRAUTERINE DEVICE INSERTION  05/25/2022    TONSILLECTOMY         Current Outpatient Medications:     acetaZOLAMIDE (DIAMOX) 250 MG tablet, Take 500 mg by mouth 2 (two) times daily., Disp: , Rfl:     cetirizine (ZYRTEC) 10 MG tablet, Take 10 mg by mouth once daily., Disp: , Rfl:     hydrOXYzine (ATARAX) 50 MG tablet, Take 1 tablet (50 mg total) by mouth 4 (four) times daily as needed for Itching., Disp: 120 tablet, Rfl: 1    lamoTRIgine (LAMICTAL) 200 MG tablet, Take 1,000 mg by mouth 2 (two) times a day. 2 tablets(400mg) in am and 3(600mg) tablets in pm, Disp: , Rfl:     levonorgestreL (MIRENA) 20 mcg/24 hours (8 yrs) 52 mg IUD, 1 each by Intrauterine route once., Disp: , Rfl:     levothyroxine (SYNTHROID) 125 MCG tablet, TAKE 1 TABLET EVERY MORNING ON AN EMPTY STOMACH FOR STOMACH FOR THYROID, Disp: 30 tablet, Rfl: 5    losartan (COZAAR) 100 MG tablet, Take 1 tablet (100 mg total) by mouth once daily. FOR BLOOD PRESSURE, Disp: 90 tablet, Rfl: 3    lurasidone (LATUDA) 60 mg Tab tablet, Take 1 tablet (60 mg total) by mouth once daily., Disp: 30 tablet, Rfl: 1    pantoprazole (PROTONIX) 40 MG tablet, Take 1 tablet (40 mg total) by mouth once daily., Disp: 90 tablet, Rfl: 3    phenytoin (DILANTIN) 100 MG ER capsule, Take 500 mg by mouth once daily. 300mg po am-200mg po hs c 50mg for total of 250mg po hs, Disp: , Rfl:     phenytoin (DILANTIN) 50 mg chewable tablet, Take 50 mg by mouth every evening., Disp: , Rfl:     prochlorperazine (COMPAZINE) 10 MG tablet, Take 10 mg by mouth 2 (two) times daily as needed., Disp: , Rfl:     rizatriptan (MAXALT) 10 MG tablet, Take 10 mg by mouth daily as needed., Disp: , Rfl:     sertraline (ZOLOFT) 100 MG tablet, Take 2 tablets (200 mg total) by mouth once daily., Disp: 60 tablet, Rfl: 1    Review of patient's allergies indicates:   Allergen Reactions    Codeine Itching     unknown       Social History     Tobacco Use    Smoking status: Every Day      "Current packs/day: 0.50     Average packs/day: 0.5 packs/day for 6.6 years (3.3 ttl pk-yrs)     Types: Cigarettes     Start date: 9/20/2017     Passive exposure: Past    Smokeless tobacco: Never   Substance Use Topics    Alcohol use: Never    Drug use: Never       Review of Systems:  General/Constitutional: Chills denies. Fatigue/weakness denies. Fever denies. Night sweats denies. Hot flashes denies    Respiratory: Cough denies. Hemoptysis denies. SOB denies. Sputum production denies. Wheezing denies .   Cardiovascular: Chest pain denies . Dizziness denies. Palpitations denies. Swelling in hands/feet denies    Gastrointestinal: Abdominal pain denies. Blood in stool denies. Constipation denies. Diarrhea denies. Heartburn denies. Nausea denies. Vomiting denies    Genitourinary: Incontinence denies. Blood in urine denies. Frequent urination denies. Painful urination denies. Urinary urgency denies. Nocturia denies    Gynecologic: Irregular menses denies. Heavy bleeding denies. Painful menses denies. Vaginal discharge denies. Vaginal odor denies. Vaginal itching denies. Vaginal lesion denies. Pelvic pain denies. Decreased libido denies. Vulvar lesion denies. Prolapse of genital organs denies. Painful intercourse denies. Postcoital bleeding denies    Psychiatric: Depression denies. Anxiety denies     Physical Exam:   Vitals:    04/30/24 1456   BP: 122/68   Weight: 130.2 kg (287 lb 0.6 oz)   Height: 4' 10" (1.473 m)       Body mass index is 59.99 kg/m².       Chaperone: present.     General appearance: healthy, well-nourished and well-developed     Psychiatric: Orientation to time, place and person. Normal mood and affect and active, alert     Skin: Appearance: no rashes or lesions.     Neck:   Neck: supple, FROM, trachea midline. and no masses   Thyroid: no enlargement or nodules and non-tender.       Cardiovascular:   Auscultation: RRR and no murmur.   Peripheral Vascular: no varicosities, LLE edema, RLE edema, calf " tenderness, and palpable cord and pedal pulses intact.     Lungs:   Respiratory effort: no intercostal retractions or accessory muscle usage.   Auscultation: no wheezing, rales/crackles, or rhonchi and clear to auscultation.     Breast:   Inspection/Palpation: no tenderness, discrete/distinct masses, skin changes, or abnormal secretions. Nipple appearance normal.     Abdomen:   Auscultation/Inspection/Palpation: no hepatomegaly, splenomegaly, masses, tenderness or CVA tenderness and soft, non-distended bowel sounds preset.    Hernia: no palpable hernias.     Female Genitalia:    Vulva: no masses, tenderness or lesions    Bladder/Urethra: no urethral discharge or mass, normal meatus, bladder non-distended.    Vagina: no tenderness, erythema, cystocele, rectocele, abnormal vaginal discharge or vesicle(s) or ulcers    Cervix: no discharge, no cervical lacerations noted or motion tenderness and grossly normal, strings noted    Uterus: normal size and shape and midline, non-tender, and no uterine prolapse.    Adnexa/Parametria: no parametrial tenderness or mass, no adnexal tenderness or ovarian mass.     Lymph Nodes:   Palpation: non tender submandibular nodes, axillary nodes, or inguinal nodes.     Rectal Exam:   Rectum: normal perianal skin.       Assessment:     Patient Active Problem List   Diagnosis    Bipolar I disorder    Epilepsy    GERD (gastroesophageal reflux disease)    Generalized anxiety disorder    Hyperlipidemia    Hypertension    Hypothyroidism    Migraine headache    Morbid obesity    Schizoaffective disorder    Benign intracranial hypertension    Conversion disorder with attacks or seizures    Seizure-like activity    RAHEL (obstructive sleep apnea)    PUD (peptic ulcer disease)       Health Maintenance Due   Topic Date Due    Pneumococcal Vaccines (Age 0-64) (1 of 2 - PCV) Never done    TETANUS VACCINE  06/22/2020    COVID-19 Vaccine (4 - 2023-24 season) 09/01/2023    Cervical Cancer Screening   03/07/2024     Health Maintenance Topics with due status: Not Due       Topic Last Completion Date    Hemoglobin A1c (Diabetic Prevention Screening) 07/05/2023         Plan:    Ebony was seen today for annual exam.    Diagnoses and all orders for this visit:    Routine gynecological examination  -     Liquid-Based Pap Smear, Screening Screening  PAP with cultures  Counseled regarding safe sex practices and prevention of STD's .  Discussed contraception options.  Advised avoidance of tobacco, alcohol, and drugs.  Discussed breast self-awareness  Seat belt  Multivitamin, Ca/Vit D  Healthy diet and exercise  RTC 1 yr   IUD (intrauterine device) in place  Advised to check strings regularly  Morbid obesity with BMI of 50.0-59.9, adult

## 2024-04-30 ENCOUNTER — OFFICE VISIT (OUTPATIENT)
Dept: OBSTETRICS AND GYNECOLOGY | Facility: CLINIC | Age: 39
End: 2024-04-30
Payer: MEDICAID

## 2024-04-30 VITALS
SYSTOLIC BLOOD PRESSURE: 122 MMHG | BODY MASS INDEX: 60.26 KG/M2 | HEIGHT: 58 IN | WEIGHT: 287.06 LBS | DIASTOLIC BLOOD PRESSURE: 68 MMHG

## 2024-04-30 DIAGNOSIS — Z97.5 IUD (INTRAUTERINE DEVICE) IN PLACE: ICD-10-CM

## 2024-04-30 DIAGNOSIS — Z01.419 ROUTINE GYNECOLOGICAL EXAMINATION: Primary | ICD-10-CM

## 2024-04-30 DIAGNOSIS — E66.01 MORBID OBESITY WITH BMI OF 50.0-59.9, ADULT: ICD-10-CM

## 2024-04-30 PROCEDURE — 87624 HPV HI-RISK TYP POOLED RSLT: CPT | Performed by: NURSE PRACTITIONER

## 2024-04-30 PROCEDURE — 3074F SYST BP LT 130 MM HG: CPT | Mod: CPTII,,, | Performed by: NURSE PRACTITIONER

## 2024-04-30 PROCEDURE — 87491 CHLMYD TRACH DNA AMP PROBE: CPT | Performed by: NURSE PRACTITIONER

## 2024-04-30 PROCEDURE — 99395 PREV VISIT EST AGE 18-39: CPT | Mod: ,,, | Performed by: NURSE PRACTITIONER

## 2024-04-30 PROCEDURE — 3078F DIAST BP <80 MM HG: CPT | Mod: CPTII,,, | Performed by: NURSE PRACTITIONER

## 2024-04-30 PROCEDURE — 4010F ACE/ARB THERAPY RXD/TAKEN: CPT | Mod: CPTII,,, | Performed by: NURSE PRACTITIONER

## 2024-04-30 PROCEDURE — 87591 N.GONORRHOEAE DNA AMP PROB: CPT | Performed by: NURSE PRACTITIONER

## 2024-04-30 PROCEDURE — 1159F MED LIST DOCD IN RCRD: CPT | Mod: CPTII,,, | Performed by: NURSE PRACTITIONER

## 2024-04-30 PROCEDURE — 1160F RVW MEDS BY RX/DR IN RCRD: CPT | Mod: CPTII,,, | Performed by: NURSE PRACTITIONER

## 2024-04-30 PROCEDURE — 3008F BODY MASS INDEX DOCD: CPT | Mod: CPTII,,, | Performed by: NURSE PRACTITIONER

## 2024-04-30 PROCEDURE — 87661 TRICHOMONAS VAGINALIS AMPLIF: CPT | Performed by: NURSE PRACTITIONER

## 2024-05-07 LAB
CHLAMYDIA TRACHOMATIS: NEGATIVE
HIGH RISK HPV: NEGATIVE
NEISSERIA GONORRHOEAE: NEGATIVE
PSYCHE PATHOLOGY RESULT: NORMAL
TRICHOMONAS VAGINALIS: NEGATIVE

## 2024-05-09 ENCOUNTER — OFFICE VISIT (OUTPATIENT)
Dept: FAMILY MEDICINE | Facility: CLINIC | Age: 39
End: 2024-05-09
Payer: MEDICAID

## 2024-05-09 VITALS
SYSTOLIC BLOOD PRESSURE: 128 MMHG | TEMPERATURE: 98 F | WEIGHT: 288 LBS | HEIGHT: 58 IN | BODY MASS INDEX: 60.45 KG/M2 | HEART RATE: 72 BPM | OXYGEN SATURATION: 98 % | DIASTOLIC BLOOD PRESSURE: 66 MMHG

## 2024-05-09 DIAGNOSIS — M25.559 HIP PAIN, UNSPECIFIED LATERALITY: ICD-10-CM

## 2024-05-09 DIAGNOSIS — M21.70 LEG LENGTH DISCREPANCY: Primary | ICD-10-CM

## 2024-05-09 DIAGNOSIS — S39.012A STRAIN OF LUMBAR REGION, INITIAL ENCOUNTER: ICD-10-CM

## 2024-05-09 PROCEDURE — 3074F SYST BP LT 130 MM HG: CPT | Mod: CPTII,,, | Performed by: NURSE PRACTITIONER

## 2024-05-09 PROCEDURE — 4010F ACE/ARB THERAPY RXD/TAKEN: CPT | Mod: CPTII,,, | Performed by: NURSE PRACTITIONER

## 2024-05-09 PROCEDURE — 3008F BODY MASS INDEX DOCD: CPT | Mod: CPTII,,, | Performed by: NURSE PRACTITIONER

## 2024-05-09 PROCEDURE — 3078F DIAST BP <80 MM HG: CPT | Mod: CPTII,,, | Performed by: NURSE PRACTITIONER

## 2024-05-09 PROCEDURE — 99213 OFFICE O/P EST LOW 20 MIN: CPT | Mod: ,,, | Performed by: NURSE PRACTITIONER

## 2024-05-09 RX ORDER — METHYLPREDNISOLONE 4 MG/1
TABLET ORAL
Qty: 21 EACH | Refills: 0 | Status: SHIPPED | OUTPATIENT
Start: 2024-05-09 | End: 2024-05-21

## 2024-05-09 NOTE — PROGRESS NOTES
Patient ID: [id number]     Chief Complaint: Follow-up and Referral (Shoe inserts)      HPI:     Ebony Richard is a 38 y.o. female in the office for Follow-up and Referral (Shoe inserts)    She had 9 seizures in a two week period of time in March. She's still seeing her old neurologist, cancelled new patient appointment with new neurologist due to transportation issues.      She's c/o pain in her hip, knee, and low back and requesting new shoe inserts or custom shoes for leg length discrepancy.       Past Medical History:  has a past medical history of Anxiety, Bipolar disorder, Epilepsy, unspecified, not intractable, with status epilepticus, GERD (gastroesophageal reflux disease), Hematochezia, Hyperlipidemia, Hypertension, Hypothyroidism, Intracranial hypertension, Long term use of drug, Loose stools, Migraines, Obesity, unspecified, and Schizoaffective disorder.    Social History:  reports that she has been smoking cigarettes. She started smoking about 6 years ago. She has a 3.4 pack-year smoking history. She has been exposed to tobacco smoke. She has never used smokeless tobacco. She reports that she does not drink alcohol and does not use drugs.    Current Outpatient Medications   Medication Instructions    acetaZOLAMIDE (DIAMOX) 500 mg, Oral, 2 times daily    cetirizine (ZYRTEC) 10 mg, Oral, Daily    hydrOXYzine (ATARAX) 50 mg, Oral, 4 times daily PRN    lamoTRIgine (LAMICTAL) 1,000 mg, Oral, 2 times daily, 2 tablets(400mg) in am and 3(600mg) tablets in pm    levonorgestreL (MIRENA) 20 mcg/24 hours (8 yrs) 52 mg IUD 1 each, Intrauterine, Once    levothyroxine (SYNTHROID) 125 MCG tablet TAKE 1 TABLET EVERY MORNING ON AN EMPTY STOMACH FOR STOMACH FOR THYROID    losartan (COZAAR) 100 mg, Oral, Daily, FOR BLOOD PRESSURE    lurasidone (LATUDA) 60 mg, Oral, Daily    pantoprazole (PROTONIX) 40 mg, Oral, Daily    phenytoin (DILANTIN) 50 mg, Oral, Nightly    phenytoin (DILANTIN) 500 mg, Oral, Daily, 300mg po am-200mg  "po hs c 50mg for total of 250mg po hs    rizatriptan (MAXALT) 10 mg, Oral, Daily PRN    sertraline (ZOLOFT) 200 mg, Oral, Daily       Patient is allergic to codeine.     Patient Care Team:  Evelyn Chun FNP-C as PCP - General (Family Medicine)  Benji Alaniz MD (Neurology)  Saad Combs PMHNP as Nurse Practitioner (Psychiatry)  Brenda Heart NP as Nurse Practitioner (Obstetrics and Gynecology)     Subjective     Review of Systems    See HPI     Objective     Visit Vitals  /66 (BP Location: Right arm, Patient Position: Sitting)   Pulse 72   Temp 97.5 °F (36.4 °C) (Temporal)   Ht 4' 10" (1.473 m)   Wt 130.6 kg (288 lb)   SpO2 98%   BMI 60.19 kg/m²       Physical Exam  Vitals reviewed.   Constitutional:       Appearance: Normal appearance. She is morbidly obese.   HENT:      Head: Normocephalic and atraumatic.   Cardiovascular:      Rate and Rhythm: Normal rate and regular rhythm.      Heart sounds: Normal heart sounds.   Pulmonary:      Effort: Pulmonary effort is normal.      Breath sounds: Normal breath sounds.   Musculoskeletal:      Right lower leg: No edema.      Left lower leg: No edema.      Comments: Leg length discrepancy, + pelvic tilt   Skin:     General: Skin is warm and dry.   Neurological:      Mental Status: She is alert and oriented to person, place, and time.   Psychiatric:         Mood and Affect: Mood normal.         Behavior: Behavior normal.         Assessment & Plan:     1. Leg length discrepancy  Assessment & Plan:  In need of custom shoes for proper alignment of back, hips, knees. Sending referral.       2. Strain of lumbar region, initial encounter  -     ORTHOTIC DEVICE (DME)    3. Hip pain, unspecified laterality    Other orders  methylPREDNISolone (MEDROL DOSEPACK) 4 mg tablet; use as directed (Patient not taking: Reported on 5/21/2024)  Dispense: 21 each; Refill: 0       Follow up in about 2 months (around 7/9/2024) for Wellness, fasting labs " prior. In addition to their next scheduled appointment, the patient has also been instructed to follow up on as needed basis.     Future Appointments   Date Time Provider Department Center   7/16/2024  8:50 AM LAB, [facility] LABORATORY DRAW STATION [facility] W[facility] Garcia [facility]   7/23/2024  8:30 AM Evelyn Chun FNP-JULISSA [facility]ha [facility]   7/23/2024  9:30 AM Saad Combs PMHNP [facility]   5/6/2025  1:00 PM Brenda Heart, NP [facility] PARVEEN [facility]

## 2024-05-21 ENCOUNTER — OFFICE VISIT (OUTPATIENT)
Dept: BEHAVIORAL HEALTH | Facility: CLINIC | Age: 39
End: 2024-05-21
Payer: MEDICAID

## 2024-05-21 VITALS
SYSTOLIC BLOOD PRESSURE: 136 MMHG | BODY MASS INDEX: 60.66 KG/M2 | DIASTOLIC BLOOD PRESSURE: 82 MMHG | WEIGHT: 289 LBS | HEART RATE: 71 BPM | TEMPERATURE: 99 F | OXYGEN SATURATION: 99 % | HEIGHT: 58 IN

## 2024-05-21 DIAGNOSIS — F44.5 CONVERSION DISORDER WITH ATTACKS OR SEIZURES: ICD-10-CM

## 2024-05-21 DIAGNOSIS — F41.1 GENERALIZED ANXIETY DISORDER: ICD-10-CM

## 2024-05-21 DIAGNOSIS — F31.9 BIPOLAR I DISORDER: Primary | ICD-10-CM

## 2024-05-21 PROCEDURE — 3008F BODY MASS INDEX DOCD: CPT | Mod: CPTII,,, | Performed by: NURSE PRACTITIONER

## 2024-05-21 PROCEDURE — 4010F ACE/ARB THERAPY RXD/TAKEN: CPT | Mod: CPTII,,, | Performed by: NURSE PRACTITIONER

## 2024-05-21 PROCEDURE — 3075F SYST BP GE 130 - 139MM HG: CPT | Mod: CPTII,,, | Performed by: NURSE PRACTITIONER

## 2024-05-21 PROCEDURE — 1160F RVW MEDS BY RX/DR IN RCRD: CPT | Mod: CPTII,,, | Performed by: NURSE PRACTITIONER

## 2024-05-21 PROCEDURE — 99214 OFFICE O/P EST MOD 30 MIN: CPT | Mod: ,,, | Performed by: NURSE PRACTITIONER

## 2024-05-21 PROCEDURE — 3079F DIAST BP 80-89 MM HG: CPT | Mod: CPTII,,, | Performed by: NURSE PRACTITIONER

## 2024-05-21 PROCEDURE — 1159F MED LIST DOCD IN RCRD: CPT | Mod: CPTII,,, | Performed by: NURSE PRACTITIONER

## 2024-05-21 RX ORDER — HYDROXYZINE HYDROCHLORIDE 50 MG/1
50 TABLET, FILM COATED ORAL 4 TIMES DAILY PRN
Qty: 120 TABLET | Refills: 1 | Status: SHIPPED | OUTPATIENT
Start: 2024-05-21

## 2024-05-21 RX ORDER — SERTRALINE HYDROCHLORIDE 100 MG/1
200 TABLET, FILM COATED ORAL DAILY
Qty: 60 TABLET | Refills: 1 | Status: SHIPPED | OUTPATIENT
Start: 2024-05-21

## 2024-05-21 RX ORDER — LURASIDONE HYDROCHLORIDE 60 MG/1
60 TABLET, FILM COATED ORAL DAILY
Qty: 30 TABLET | Refills: 1 | Status: SHIPPED | OUTPATIENT
Start: 2024-05-21

## 2024-05-21 NOTE — PROGRESS NOTES
"PSYCHIATRIC FOLLOW-UP VISIT NOTE    Chief Complaint   Patient presents with    Medication Management     2 month medication management         History of Present Illness  38 y.o. year old White female with hx of bipolar disorder, ELIZA, and conversion disorder seen today for follow-up appointment and medication management.  Patient reports that she has been doing well since last visit.  Denies any recent depression.  Anxiety also has been much improved.  Patient reports no change in psychosocial stressors but feels she is coping with her anxiety more effectively.  Her home life has been stable.  Denies any seizure activity since last visit.  Sleeping well at night and feels rested in the morning.  Denies any side effects from current medication regimen. Patient denies SI/HI. Denies hallucinations and does not appear to be responding to internal stimuli or be internally preoccupied. No manic symptoms noted.       Objective:     Vitals:  Vitals:    05/21/24 1024   BP: 136/82   BP Location: Right arm   Patient Position: Sitting   BP Method: X-Large (Automatic)   Pulse: 71   Temp: 98.6 °F (37 °C)   TempSrc: Temporal   SpO2: 99%   Weight: 131.1 kg (289 lb 0.4 oz)   Height: 4' 9.99" (1.473 m)       Wt Readings from Last 3 Encounters:   05/21/24 1024 131.1 kg (289 lb 0.4 oz)   05/09/24 1012 130.6 kg (288 lb)   04/30/24 1456 130.2 kg (287 lb 0.6 oz)         Medication:    Current Outpatient Medications:     acetaZOLAMIDE (DIAMOX) 250 MG tablet, Take 500 mg by mouth 2 (two) times daily., Disp: , Rfl:     cetirizine (ZYRTEC) 10 MG tablet, Take 10 mg by mouth once daily., Disp: , Rfl:     lamoTRIgine (LAMICTAL) 200 MG tablet, Take 1,000 mg by mouth 2 (two) times a day. 2 tablets(400mg) in am and 3(600mg) tablets in pm, Disp: , Rfl:     levonorgestreL (MIRENA) 20 mcg/24 hours (8 yrs) 52 mg IUD, 1 each by Intrauterine route once., Disp: , Rfl:     levothyroxine (SYNTHROID) 125 MCG tablet, TAKE 1 TABLET EVERY MORNING ON AN EMPTY " STOMACH FOR STOMACH FOR THYROID, Disp: 30 tablet, Rfl: 5    losartan (COZAAR) 100 MG tablet, Take 1 tablet (100 mg total) by mouth once daily. FOR BLOOD PRESSURE, Disp: 90 tablet, Rfl: 3    pantoprazole (PROTONIX) 40 MG tablet, Take 1 tablet (40 mg total) by mouth once daily., Disp: 90 tablet, Rfl: 3    phenytoin (DILANTIN) 100 MG ER capsule, Take 500 mg by mouth once daily. 300mg po am-200mg po hs c 50mg for total of 250mg po hs, Disp: , Rfl:     phenytoin (DILANTIN) 50 mg chewable tablet, Take 50 mg by mouth every evening., Disp: , Rfl:     rizatriptan (MAXALT) 10 MG tablet, Take 10 mg by mouth daily as needed., Disp: , Rfl:     hydrOXYzine (ATARAX) 50 MG tablet, Take 1 tablet (50 mg total) by mouth 4 (four) times daily as needed for Itching., Disp: 120 tablet, Rfl: 1    lurasidone (LATUDA) 60 mg Tab tablet, Take 1 tablet (60 mg total) by mouth once daily., Disp: 30 tablet, Rfl: 1    sertraline (ZOLOFT) 100 MG tablet, Take 2 tablets (200 mg total) by mouth once daily., Disp: 60 tablet, Rfl: 1       Significant Labs: - none at this time    Significant Imaging: - none at this time    Physical Exam  Vitals and nursing note reviewed.   Constitutional:       General: She is awake.      Appearance: Normal appearance.   Musculoskeletal:      Comments: Full ROM   Neurological:      Mental Status: She is alert.   Psychiatric:         Attention and Perception: Attention and perception normal. She does not perceive auditory or visual hallucinations.         Mood and Affect: Affect normal.         Speech: Speech normal.         Behavior: Behavior is cooperative.         Thought Content: Thought content does not include homicidal or suicidal ideation.         Cognition and Memory: Cognition and memory normal.          Review of Systems     Mental Status Exam:  Presentation:  - Appearance: 38 y.o. year old White female, appears stated age, appears Casually dressed and Well groomed  - Motility: Erect when standing, Steady gait,  "No EPS or Tremors, No psychomotor agitation or retardation appreciated  - Behavior: calm, cooperative, good eye contact  Speech:  - Character/Organization: spontaneous, fluent, normal volume, normal rate, normal rhythm  Emotional State:  - Mood: "content"   - Affect: congruent and appropriate  Thought:  - Process: logical, linear, organized , goal-directed  - Preoccupations: no ruminations, rituals, or phobias appreciated  - Delusions: no persecutory, paranoid, or grandiose delusions appreciated  - Perception: denies AVH, not actively responding to internal stimuli  - SI/HI: denies/denies  Sensorium & Intellect:  - Sensorium: AAOx4  - Memory: intact to recent and remote events  - Attention/Concentration: good/good  - Insight/Judgement: good/good    Gait: normal swing and stance  MSK:no rigidity appreciated    All other systems without acute issues unless noted in HPI      Assessment/Plan      ICD-10-CM ICD-9-CM    1. Bipolar I disorder  F31.9 296.7 lurasidone (LATUDA) 60 mg Tab tablet      2. Generalized anxiety disorder  F41.1 300.02 hydrOXYzine (ATARAX) 50 MG tablet      sertraline (ZOLOFT) 100 MG tablet      3. Conversion disorder with attacks or seizures  F44.5 300.11          Continue current medications without change    Potential side effects and risks vs benefits of current treatment plan reviewed with patient. Applicable black box warnings reviewed. Encouraged patient not to alter dosages or abruptly discontinue medications without contacting prescriber first, due to risk of worsening symptoms and decompensation of mental status. Warned of risks associated with herbal remedies and supplements while taking psychotropic medications and of the need to consult prescriber prior to adding any of these to current regimen. Patient should abstain from abuse of alcohol, prescription medications, and illicit drugs. Reviewed when to contact clinic and/or seek emergent care, such as but not limited to, onset/worsening " SI/HI, hallucinations, delusions, manic symptoms. Pt verbalized understanding and agreement of these warnings/recommendations and verbally consented to treatment plan.      Follow up in about 2 months (around 7/21/2024) for Medication Management.    Saad Combs, [facility]

## 2024-05-29 ENCOUNTER — TELEPHONE (OUTPATIENT)
Dept: FAMILY MEDICINE | Facility: CLINIC | Age: 39
End: 2024-05-29
Payer: MEDICAID

## 2024-05-29 DIAGNOSIS — M21.70 LEG LENGTH DISCREPANCY: Primary | ICD-10-CM

## 2024-06-11 ENCOUNTER — TELEPHONE (OUTPATIENT)
Dept: FAMILY MEDICINE | Facility: CLINIC | Age: 39
End: 2024-06-11
Payer: MEDICAID

## 2024-06-11 DIAGNOSIS — M21.70 LEG LENGTH DISCREPANCY: Primary | ICD-10-CM

## 2024-07-08 DIAGNOSIS — E03.9 HYPOTHYROIDISM, UNSPECIFIED TYPE: ICD-10-CM

## 2024-07-08 RX ORDER — LEVOTHYROXINE SODIUM 125 UG/1
TABLET ORAL
Qty: 30 TABLET | Refills: 5 | Status: SHIPPED | OUTPATIENT
Start: 2024-07-08

## 2024-07-16 PROCEDURE — 82306 VITAMIN D 25 HYDROXY: CPT | Performed by: NURSE PRACTITIONER

## 2024-07-16 PROCEDURE — 84443 ASSAY THYROID STIM HORMONE: CPT | Performed by: NURSE PRACTITIONER

## 2024-07-16 PROCEDURE — 83036 HEMOGLOBIN GLYCOSYLATED A1C: CPT | Performed by: NURSE PRACTITIONER

## 2024-07-16 PROCEDURE — 85025 COMPLETE CBC W/AUTO DIFF WBC: CPT | Performed by: NURSE PRACTITIONER

## 2024-07-16 PROCEDURE — 80061 LIPID PANEL: CPT | Performed by: NURSE PRACTITIONER

## 2024-07-16 PROCEDURE — 80053 COMPREHEN METABOLIC PANEL: CPT | Performed by: NURSE PRACTITIONER

## 2024-07-18 ENCOUNTER — OFFICE VISIT (OUTPATIENT)
Dept: BEHAVIORAL HEALTH | Facility: CLINIC | Age: 39
End: 2024-07-18
Payer: MEDICAID

## 2024-07-18 VITALS
OXYGEN SATURATION: 98 % | BODY MASS INDEX: 59.84 KG/M2 | HEART RATE: 81 BPM | DIASTOLIC BLOOD PRESSURE: 78 MMHG | WEIGHT: 285.06 LBS | HEIGHT: 58 IN | TEMPERATURE: 100 F | SYSTOLIC BLOOD PRESSURE: 128 MMHG

## 2024-07-18 DIAGNOSIS — F44.5 CONVERSION DISORDER WITH ATTACKS OR SEIZURES: ICD-10-CM

## 2024-07-18 DIAGNOSIS — F41.1 GENERALIZED ANXIETY DISORDER: ICD-10-CM

## 2024-07-18 DIAGNOSIS — F31.9 BIPOLAR I DISORDER: Primary | ICD-10-CM

## 2024-07-18 PROCEDURE — 1160F RVW MEDS BY RX/DR IN RCRD: CPT | Mod: CPTII,,, | Performed by: NURSE PRACTITIONER

## 2024-07-18 PROCEDURE — 99214 OFFICE O/P EST MOD 30 MIN: CPT | Mod: ,,, | Performed by: NURSE PRACTITIONER

## 2024-07-18 PROCEDURE — 3078F DIAST BP <80 MM HG: CPT | Mod: CPTII,,, | Performed by: NURSE PRACTITIONER

## 2024-07-18 PROCEDURE — 3008F BODY MASS INDEX DOCD: CPT | Mod: CPTII,,, | Performed by: NURSE PRACTITIONER

## 2024-07-18 PROCEDURE — 3074F SYST BP LT 130 MM HG: CPT | Mod: CPTII,,, | Performed by: NURSE PRACTITIONER

## 2024-07-18 PROCEDURE — 4010F ACE/ARB THERAPY RXD/TAKEN: CPT | Mod: CPTII,,, | Performed by: NURSE PRACTITIONER

## 2024-07-18 PROCEDURE — 3044F HG A1C LEVEL LT 7.0%: CPT | Mod: CPTII,,, | Performed by: NURSE PRACTITIONER

## 2024-07-18 PROCEDURE — 1159F MED LIST DOCD IN RCRD: CPT | Mod: CPTII,,, | Performed by: NURSE PRACTITIONER

## 2024-07-18 RX ORDER — HYDROXYZINE HYDROCHLORIDE 50 MG/1
50 TABLET, FILM COATED ORAL 4 TIMES DAILY PRN
Qty: 120 TABLET | Refills: 1 | Status: SHIPPED | OUTPATIENT
Start: 2024-07-18

## 2024-07-18 RX ORDER — SERTRALINE HYDROCHLORIDE 100 MG/1
200 TABLET, FILM COATED ORAL DAILY
Qty: 60 TABLET | Refills: 1 | Status: SHIPPED | OUTPATIENT
Start: 2024-07-18

## 2024-07-18 RX ORDER — LURASIDONE HYDROCHLORIDE 80 MG/1
80 TABLET, FILM COATED ORAL DAILY
Qty: 30 TABLET | Refills: 0 | Status: SHIPPED | OUTPATIENT
Start: 2024-07-18

## 2024-07-18 NOTE — PROGRESS NOTES
"PSYCHIATRIC FOLLOW-UP VISIT NOTE    Chief Complaint   Patient presents with    Medication Management     2 month medication management         History of Present Illness  38 y.o. year old White female with hx of bipolar disorder, ELIZA, and conversion disorder with seizures seen today for follow-up appointment and medication management.  During today's visit patient disclosed that she has been experiencing intrusive thoughts for the past month or so.  States they have a longer history than that but has been more frequent for the past month.  Has been able to cope with them effectively and adamantly denies any plan or intent to harm herself or others.  Denies any recent depression and reports good mood most of the week.  Denies any increased anxiety.  Has been having some mild irritability but is coping with this effectively.  She did verbally contract for safety during today's visit.  Safety plan includes her mother and father who live close by in her friend, [place].  She denies any side effects from current medication regimen.  Has been sleeping well and feels rested in the mornings. Patient denies SI/HI. Denies hallucinations and does not appear to be responding to internal stimuli or be internally preoccupied. No manic symptoms noted. Tolerating SGA therapy without serious side effects. No NMS s/s. No EPS or TD symptoms noted. AIMS=0.        Objective:     Vitals:  Vitals:    07/18/24 1136   BP: 128/78   BP Location: Right arm   Patient Position: Sitting   BP Method: Large (Manual)   Pulse: 81   Temp: 99.5 °F (37.5 °C)   TempSrc: Temporal   SpO2: 98%   Weight: 129.3 kg (285 lb 0.9 oz)   Height: 4' 9.99" (1.473 m)       Wt Readings from Last 3 Encounters:   07/18/24 1136 129.3 kg (285 lb 0.9 oz)   05/21/24 1024 131.1 kg (289 lb 0.4 oz)   05/09/24 1012 130.6 kg (288 lb)         Medication:    Current Outpatient Medications:     acetaZOLAMIDE (DIAMOX) 250 MG tablet, Take 500 mg by mouth 2 (two) times daily., Disp: , " Rfl:     cetirizine (ZYRTEC) 10 MG tablet, Take 10 mg by mouth once daily., Disp: , Rfl:     lamoTRIgine (LAMICTAL) 200 MG tablet, Take 1,000 mg by mouth 2 (two) times a day. 2 tablets(400mg) in am and 3(600mg) tablets in pm, Disp: , Rfl:     levonorgestreL (MIRENA) 20 mcg/24 hours (8 yrs) 52 mg IUD, 1 each by Intrauterine route once., Disp: , Rfl:     levothyroxine (SYNTHROID) 125 MCG tablet, TAKE 1 TABLET EVERY MORNING ON AN EMPTY STOMACH FOR THYROID, Disp: 30 tablet, Rfl: 5    losartan (COZAAR) 100 MG tablet, Take 1 tablet (100 mg total) by mouth once daily. FOR BLOOD PRESSURE, Disp: 90 tablet, Rfl: 3    pantoprazole (PROTONIX) 40 MG tablet, Take 1 tablet (40 mg total) by mouth once daily., Disp: 90 tablet, Rfl: 3    phenytoin (DILANTIN) 100 MG ER capsule, Take 500 mg by mouth once daily. 300mg po am-200mg po hs c 50mg for total of 250mg po hs, Disp: , Rfl:     phenytoin (DILANTIN) 50 mg chewable tablet, Take 50 mg by mouth every evening., Disp: , Rfl:     hydrOXYzine (ATARAX) 50 MG tablet, Take 1 tablet (50 mg total) by mouth 4 (four) times daily as needed for Itching., Disp: 120 tablet, Rfl: 1    lurasidone (LATUDA) 80 mg Tab tablet, Take 1 tablet (80 mg total) by mouth once daily., Disp: 30 tablet, Rfl: 0    rizatriptan (MAXALT) 10 MG tablet, Take 10 mg by mouth daily as needed. (Patient not taking: Reported on 7/18/2024), Disp: , Rfl:     sertraline (ZOLOFT) 100 MG tablet, Take 2 tablets (200 mg total) by mouth once daily., Disp: 60 tablet, Rfl: 1       Significant Labs: - none at this time    Significant Imaging: - none at this time    Physical Exam  Vitals and nursing note reviewed.   Constitutional:       General: She is awake.      Appearance: Normal appearance.   Musculoskeletal:      Comments: Full ROM   Neurological:      Mental Status: She is alert.   Psychiatric:         Attention and Perception: Attention and perception normal. She does not perceive auditory or visual hallucinations.         Mood  "and Affect: Affect normal. Mood is anxious.         Speech: Speech normal.         Behavior: Behavior is cooperative.         Thought Content: Thought content does not include homicidal or suicidal ideation.         Cognition and Memory: Cognition and memory normal.         Judgment: Judgment normal.      Comments: Intrusive thoughts          Review of Systems     Mental Status Exam:  Presentation:  - Appearance: 38 y.o. year old White female, appears stated age, appears Casually dressed and Well groomed  - Motility: Erect when standing, Steady gait, No EPS or Tremors, No psychomotor agitation or retardation appreciated  - Behavior: anxious, cooperative, maintains eye contact  Speech:  - Character/Organization: spontaneous, fluent, normal volume, normal rate, normal rhythm  Emotional State:  - Mood: "anxious, happy"   - Affect: congruent and anxious  Thought:  - Process: logical, linear, organized , goal-directed  - Preoccupations: no ruminations, rituals, or phobias appreciated  - Delusions: no persecutory, paranoid, or grandiose delusions appreciated  - Perception: denies AVH, not actively responding to internal stimuli  - SI/HI: denies/denies  Sensorium & Intellect:  - Sensorium: AAOx4  - Memory: intact to recent and remote events  - Attention/Concentration: good/good  - Insight/Judgement: good/good    Gait: normal swing and stance  MSK:no rigidity appreciated    All other systems without acute issues unless noted in HPI      Assessment/Plan      ICD-10-CM ICD-9-CM    1. Bipolar I disorder  F31.9 296.7 lurasidone (LATUDA) 80 mg Tab tablet      2. Generalized anxiety disorder  F41.1 300.02 hydrOXYzine (ATARAX) 50 MG tablet      sertraline (ZOLOFT) 100 MG tablet      3. Conversion disorder with attacks or seizures  F44.5 300.11          Increase Latuda to 80 mg daily    Continue other medications without change    Potential side effects and risks vs benefits of current treatment plan reviewed with patient. " Applicable black box warnings reviewed. Encouraged patient not to alter dosages or abruptly discontinue medications without contacting prescriber first, due to risk of worsening symptoms and decompensation of mental status. Warned of risks associated with herbal remedies and supplements while taking psychotropic medications and of the need to consult prescriber prior to adding any of these to current regimen. Patient should abstain from abuse of alcohol, prescription medications, and illicit drugs. Reviewed when to contact clinic and/or seek emergent care, such as but not limited to, onset/worsening SI/HI, hallucinations, delusions, manic symptoms. Pt verbalized understanding and agreement of these warnings/recommendations and verbally consented to treatment plan.    Reviewed and confirmed patient's safety plan. Pt adamantly denies that they are a threat to self or others at this time, and current s/s support this. Pt verbally contracted for safety at conclusion of today's visit and verbalized when to seek emergent care. Pt also identified primary support person they can contact if symptoms begin to worsen, and they will contact clinic ASAP if this occurs.      Follow up in about 2 weeks (around 8/1/2024) for Medication Management.    Saad Combs, YUANP

## 2024-07-30 ENCOUNTER — OFFICE VISIT (OUTPATIENT)
Dept: FAMILY MEDICINE | Facility: CLINIC | Age: 39
End: 2024-07-30
Payer: MEDICAID

## 2024-07-30 VITALS
HEIGHT: 58 IN | BODY MASS INDEX: 60.87 KG/M2 | SYSTOLIC BLOOD PRESSURE: 118 MMHG | TEMPERATURE: 98 F | HEART RATE: 68 BPM | DIASTOLIC BLOOD PRESSURE: 80 MMHG | WEIGHT: 290 LBS | OXYGEN SATURATION: 98 %

## 2024-07-30 DIAGNOSIS — G43.909 MIGRAINE WITHOUT STATUS MIGRAINOSUS, NOT INTRACTABLE, UNSPECIFIED MIGRAINE TYPE: ICD-10-CM

## 2024-07-30 DIAGNOSIS — Z00.00 WELLNESS EXAMINATION: Primary | ICD-10-CM

## 2024-07-30 DIAGNOSIS — G40.909 NONINTRACTABLE EPILEPSY WITHOUT STATUS EPILEPTICUS, UNSPECIFIED EPILEPSY TYPE: ICD-10-CM

## 2024-07-30 DIAGNOSIS — E03.9 ACQUIRED HYPOTHYROIDISM: ICD-10-CM

## 2024-07-30 DIAGNOSIS — E55.9 VITAMIN D DEFICIENCY: ICD-10-CM

## 2024-07-30 PROCEDURE — 1160F RVW MEDS BY RX/DR IN RCRD: CPT | Mod: CPTII,,, | Performed by: NURSE PRACTITIONER

## 2024-07-30 PROCEDURE — 3008F BODY MASS INDEX DOCD: CPT | Mod: CPTII,,, | Performed by: NURSE PRACTITIONER

## 2024-07-30 PROCEDURE — 1159F MED LIST DOCD IN RCRD: CPT | Mod: CPTII,,, | Performed by: NURSE PRACTITIONER

## 2024-07-30 PROCEDURE — 4010F ACE/ARB THERAPY RXD/TAKEN: CPT | Mod: CPTII,,, | Performed by: NURSE PRACTITIONER

## 2024-07-30 PROCEDURE — 3044F HG A1C LEVEL LT 7.0%: CPT | Mod: CPTII,,, | Performed by: NURSE PRACTITIONER

## 2024-07-30 PROCEDURE — 99214 OFFICE O/P EST MOD 30 MIN: CPT | Mod: ,,, | Performed by: NURSE PRACTITIONER

## 2024-07-30 PROCEDURE — 3079F DIAST BP 80-89 MM HG: CPT | Mod: CPTII,,, | Performed by: NURSE PRACTITIONER

## 2024-07-30 PROCEDURE — 3074F SYST BP LT 130 MM HG: CPT | Mod: CPTII,,, | Performed by: NURSE PRACTITIONER

## 2024-07-30 RX ORDER — CHOLECALCIFEROL (VITAMIN D3) 1250 MCG
1250 TABLET ORAL
Qty: 12 TABLET | Refills: 3 | Status: SHIPPED | OUTPATIENT
Start: 2024-07-30

## 2024-07-30 NOTE — PROGRESS NOTES
Patient ID: [id number]     Chief Complaint: Annual Exam      HPI:     Ebony Richard is a 38 y.o. female here today for an annual wellness visit. Reviewed and discussed lab results.     Past Medical History:  has a past medical history of Anxiety, Bipolar disorder, Epilepsy, unspecified, not intractable, with status epilepticus, GERD (gastroesophageal reflux disease), Hematochezia, Hyperlipidemia, Hypertension, Hypothyroidism, Intracranial hypertension, Long term use of drug, Loose stools, Migraines, Obesity, unspecified, and Schizoaffective disorder.    Procedure History:  has a past surgical history that includes Intrauterine device insertion (05/25/2022); Cholecystectomy; Tonsillectomy; and esophagesophagostomy.    Family History: family history includes Bipolar disorder in her cousin; Colon cancer in her maternal aunt and maternal aunt; Depression in her brother; Diabetes type II in her father and mother; Hypertension in her father and mother; No Known Problems in her maternal grandmother, maternal uncle, paternal aunt, paternal grandfather, paternal grandmother, paternal uncle, sister, and another family member; Schizophrenia in her maternal grandfather.    Social History:  reports that she has been smoking cigarettes. She started smoking about 6 years ago. She has a 3.4 pack-year smoking history. She has been exposed to tobacco smoke. She has never used smokeless tobacco. She reports that she does not drink alcohol and does not use drugs.    Current Outpatient Medications   Medication Instructions    acetaZOLAMIDE (DIAMOX) 500 mg, Oral, 2 times daily    cetirizine (ZYRTEC) 10 mg, Oral, Daily    cholecalciferol (vitamin D3) 1,250 mcg, Oral, Every 7 days    hydrOXYzine (ATARAX) 50 mg, Oral, 4 times daily PRN    lamoTRIgine (LAMICTAL) 1,000 mg, Oral, 2 times daily, 2 tablets(400mg) in am and 3(600mg) tablets in pm    levonorgestreL (MIRENA) 20 mcg/24 hours (8 yrs) 52 mg IUD 1 each, Intrauterine, Once     "levothyroxine (SYNTHROID) 125 MCG tablet TAKE 1 TABLET EVERY MORNING ON AN EMPTY STOMACH FOR THYROID    losartan (COZAAR) 100 mg, Oral, Daily, FOR BLOOD PRESSURE    lurasidone (LATUDA) 80 mg, Oral, Daily    pantoprazole (PROTONIX) 40 mg, Oral, Daily    phenytoin (DILANTIN) 50 mg, Oral, Nightly    phenytoin (DILANTIN) 500 mg, Oral, Daily, 300mg po am-200mg po hs c 50mg for total of 250mg po hs    sertraline (ZOLOFT) 200 mg, Oral, Daily       Patient is allergic to codeine.     Patient Care Team:  Evelyn Chun FNP-C as PCP - General (Family Medicine)  Benji Alaniz MD (Neurology)  Saad Combs PMHNP as Nurse Practitioner (Psychiatry)  Brenda Heart NP as Nurse Practitioner (Obstetrics and Gynecology)      Subjective:     Review of Systems    12 point review of systems conducted, negative except as stated in the history of present illness. See HPI for details.      Objective:     Visit Vitals  /80 (BP Location: Left arm)   Pulse 68   Temp 98.1 °F (36.7 °C) (Temporal)   Ht 4' 9.99" (1.473 m)   Wt 131.5 kg (290 lb)   SpO2 98%   BMI 60.63 kg/m²       Physical Exam  Vitals reviewed.   Constitutional:       General: She is not in acute distress.     Appearance: She is morbidly obese.   HENT:      Head: Normocephalic.      Right Ear: External ear normal.      Left Ear: External ear normal.      Nose: Nose normal.      Mouth/Throat:      Mouth: Mucous membranes are moist.      Pharynx: Oropharynx is clear.   Cardiovascular:      Rate and Rhythm: Normal rate and regular rhythm.   Pulmonary:      Effort: Pulmonary effort is normal.      Breath sounds: Normal breath sounds.   Abdominal:      General: Bowel sounds are normal.      Palpations: Abdomen is soft.      Tenderness: There is no abdominal tenderness.   Musculoskeletal:         General: Normal range of motion.      Cervical back: Normal range of motion and neck supple.   Skin:     General: Skin is warm and dry.   Neurological: "      Mental Status: She is alert. Mental status is at baseline.   Psychiatric:         Mood and Affect: Mood normal.         Behavior: Behavior normal.         Labs Reviewed:     Chemistry:  Lab Results   Component Value Date     07/16/2024    K 5.0 07/16/2024    BUN 18 07/16/2024    CREATININE 0.93 07/16/2024    EGFRNORACEVR 81 07/16/2024    GLUCOSE 105 07/16/2024    CALCIUM 9.3 07/16/2024    ALKPHOS 129 07/16/2024    LABPROT 7.4 07/16/2024    ALBUMIN 4.2 07/16/2024    AST 24 07/16/2024    ALT 38 07/16/2024    [id number] 27 (L) 07/16/2024    TSH 2.380 07/16/2024        Lab Results   Component Value Date    HGBA1C 5.4 07/16/2024        Hematology:  Lab Results   Component Value Date    WBC 9.39 07/16/2024    RBC 4.80 07/16/2024    HGB 14.1 07/16/2024    HCT 42.5 07/16/2024    MCV 88.5 07/16/2024    MCH 29.4 07/16/2024    MCHC 33.2 07/16/2024    RDW 13.9 07/16/2024     07/16/2024    MPV 8.7 (L) 07/16/2024       Lipid Panel:  Lab Results   Component Value Date    CHOL 169 07/16/2024    HDL 50 07/16/2024    TRIG 124 07/16/2024          Assessment: & Plan:     1. Wellness examination  Overview:  Cervical Cancer Screening - 4/30/24  Breast Cancer Screening - n/a  Osteoporosis Screening - n/a  Colon Cancer Screening - n/a  Eye Exam - Recommended annually.  Dental Exam - Recommended biannually    Encouraged to receive vaccinations at a nearby pharmacy.      2. Nonintractable epilepsy without status epilepticus, unspecified epilepsy type  Overview:  On lamotrigine 1000 mg bid, dilantin 500 mg daily    Assessment & Plan:  Until yesterday, she had not had a seizure since march.       3. Acquired hypothyroidism  Overview:  On levothyroxine 125 mcg      4. Migraine without status migrainosus, not intractable, unspecified migraine type  Assessment & Plan:  Having only 2 per month.       5. Vitamin D deficiency  -     cholecalciferol, vitamin D3, 1,250 mcg (50,000 unit) Tab; Take 1,250 mcg by mouth every 7 days.   Dispense: 12 tablet; Refill: 3          Vaccinations -   Immunization History   Administered Date(s) Administered    COVID-19 MRNA, LN-S PF (MODERNA HALF 0.25 ML DOSE) 12/28/2021    COVID-19, MRNA, LN-S, PF (MODERNA FULL 0.5 ML DOSE) 03/29/2021, 04/26/2021    DTP 07/28/1986, 12/22/1986, 04/06/1987, 04/14/1988, 03/25/1991    HPV 9-Valent 06/22/2010, 11/01/2010, 01/31/2012    HPV Quadrivalent 06/22/2010, 11/01/2010, 01/31/2012    Hepatitis B, Pediatric/Adolescent 10/28/1997, 12/29/1997, 07/13/1998    Influenza - Quadrivalent - MDCK - PF 12/12/2017    Influenza - Quadrivalent - PF *Preferred* (6 months and older) 11/22/2022, 12/15/2023    Influenza - Trivalent - PF (ADULT) 11/01/2010, 12/10/2015, 11/21/2016, 10/12/2021    MMR 12/22/1986, 03/25/1991    OPV 07/28/1986, 12/22/1986, 04/14/1988, 03/25/1991    Tdap 06/22/2010        The patient's Health Maintenance was reviewed and the following appears to be due at this time:   Health Maintenance Due   Topic Date Due    Pneumococcal Vaccines (Age 0-64) (1 of 2 - PCV) Never done    TETANUS VACCINE  06/22/2020    COVID-19 Vaccine (4 - 2023-24 season) 09/01/2023       No follow-ups on file. In addition to their scheduled follow up, the patient has also been instructed to follow up on as needed basis.     Future Appointments   Date Time Provider Department Center   8/5/2024  2:30 PM Saad Combs [facility]  [facility]   5/6/2025  1:00 PM Brenda Heart, NP [facility]

## 2024-08-05 ENCOUNTER — OFFICE VISIT (OUTPATIENT)
Dept: BEHAVIORAL HEALTH | Facility: CLINIC | Age: 39
End: 2024-08-05
Payer: MEDICAID

## 2024-08-05 VITALS
BODY MASS INDEX: 61.5 KG/M2 | HEIGHT: 58 IN | OXYGEN SATURATION: 100 % | WEIGHT: 293 LBS | SYSTOLIC BLOOD PRESSURE: 132 MMHG | DIASTOLIC BLOOD PRESSURE: 78 MMHG | TEMPERATURE: 99 F | HEART RATE: 77 BPM

## 2024-08-05 DIAGNOSIS — F44.5 CONVERSION DISORDER WITH ATTACKS OR SEIZURES: ICD-10-CM

## 2024-08-05 DIAGNOSIS — F31.9 BIPOLAR I DISORDER: ICD-10-CM

## 2024-08-05 DIAGNOSIS — F31.9 BIPOLAR I DISORDER: Primary | ICD-10-CM

## 2024-08-05 DIAGNOSIS — F41.1 GENERALIZED ANXIETY DISORDER: ICD-10-CM

## 2024-08-05 PROCEDURE — 3078F DIAST BP <80 MM HG: CPT | Mod: CPTII,,, | Performed by: NURSE PRACTITIONER

## 2024-08-05 PROCEDURE — 1159F MED LIST DOCD IN RCRD: CPT | Mod: CPTII,,, | Performed by: NURSE PRACTITIONER

## 2024-08-05 PROCEDURE — 3008F BODY MASS INDEX DOCD: CPT | Mod: CPTII,,, | Performed by: NURSE PRACTITIONER

## 2024-08-05 PROCEDURE — 4010F ACE/ARB THERAPY RXD/TAKEN: CPT | Mod: CPTII,,, | Performed by: NURSE PRACTITIONER

## 2024-08-05 PROCEDURE — 3075F SYST BP GE 130 - 139MM HG: CPT | Mod: CPTII,,, | Performed by: NURSE PRACTITIONER

## 2024-08-05 PROCEDURE — 1160F RVW MEDS BY RX/DR IN RCRD: CPT | Mod: CPTII,,, | Performed by: NURSE PRACTITIONER

## 2024-08-05 PROCEDURE — 3044F HG A1C LEVEL LT 7.0%: CPT | Mod: CPTII,,, | Performed by: NURSE PRACTITIONER

## 2024-08-05 PROCEDURE — 99214 OFFICE O/P EST MOD 30 MIN: CPT | Mod: ,,, | Performed by: NURSE PRACTITIONER

## 2024-08-05 RX ORDER — LURASIDONE HYDROCHLORIDE 80 MG/1
80 TABLET, FILM COATED ORAL DAILY
Qty: 30 TABLET | Refills: 1 | Status: SHIPPED | OUTPATIENT
Start: 2024-08-05

## 2024-08-05 RX ORDER — SERTRALINE HYDROCHLORIDE 100 MG/1
200 TABLET, FILM COATED ORAL DAILY
Qty: 60 TABLET | Refills: 1 | Status: SHIPPED | OUTPATIENT
Start: 2024-08-05

## 2024-08-05 RX ORDER — HYDROXYZINE HYDROCHLORIDE 50 MG/1
50 TABLET, FILM COATED ORAL 4 TIMES DAILY PRN
Qty: 120 TABLET | Refills: 1 | Status: SHIPPED | OUTPATIENT
Start: 2024-08-05

## 2024-08-05 RX ORDER — LURASIDONE HYDROCHLORIDE 80 MG/1
80 TABLET, FILM COATED ORAL
Qty: 30 TABLET | Refills: 0 | OUTPATIENT
Start: 2024-08-05

## 2024-08-12 ENCOUNTER — TELEPHONE (OUTPATIENT)
Dept: FAMILY MEDICINE | Facility: CLINIC | Age: 39
End: 2024-08-12
Payer: MEDICAID

## 2024-09-06 ENCOUNTER — OFFICE VISIT (OUTPATIENT)
Dept: BEHAVIORAL HEALTH | Facility: CLINIC | Age: 39
End: 2024-09-06
Payer: MEDICAID

## 2024-09-06 VITALS
TEMPERATURE: 99 F | OXYGEN SATURATION: 100 % | HEART RATE: 79 BPM | WEIGHT: 291 LBS | BODY MASS INDEX: 61.09 KG/M2 | SYSTOLIC BLOOD PRESSURE: 128 MMHG | DIASTOLIC BLOOD PRESSURE: 78 MMHG | HEIGHT: 58 IN

## 2024-09-06 DIAGNOSIS — F44.5 CONVERSION DISORDER WITH ATTACKS OR SEIZURES: ICD-10-CM

## 2024-09-06 DIAGNOSIS — F31.9 BIPOLAR I DISORDER: Primary | ICD-10-CM

## 2024-09-06 DIAGNOSIS — F41.1 GENERALIZED ANXIETY DISORDER: ICD-10-CM

## 2024-09-06 RX ORDER — HYDROXYZINE HYDROCHLORIDE 50 MG/1
50 TABLET, FILM COATED ORAL 4 TIMES DAILY PRN
Qty: 120 TABLET | Refills: 0 | Status: SHIPPED | OUTPATIENT
Start: 2024-09-06

## 2024-09-06 RX ORDER — SERTRALINE HYDROCHLORIDE 100 MG/1
200 TABLET, FILM COATED ORAL DAILY
Qty: 60 TABLET | Refills: 0 | Status: SHIPPED | OUTPATIENT
Start: 2024-09-06

## 2024-09-06 RX ORDER — LURASIDONE HYDROCHLORIDE 80 MG/1
80 TABLET, FILM COATED ORAL DAILY
Qty: 30 TABLET | Refills: 0 | Status: SHIPPED | OUTPATIENT
Start: 2024-09-06

## 2024-09-06 NOTE — PROGRESS NOTES
"PSYCHIATRIC FOLLOW-UP VISIT NOTE    Chief Complaint   Patient presents with    Medication Management     4 week medication management         History of Present Illness  38 y.o. year old White female with hx of bipolar disorder, ELIZA, and conversion disorder with seizures seen today for follow-up appointment and medication management.  Patient reports that she has been doing fairly well since last visit.  Describes depression as minimal.  No intrusive thoughts or morbid ruminations.  Finds it was easier for her to go in public but she does still have occasional seizures secondary to conversion disorder when she was under severe anxiety.  Has continued to work on selfishness and self-centeredness.  Still finding it difficult to empathize with others.  She was sleeping well most nights and feels rested in the mornings.  Denies any side effects from current medication regimen. Patient denies SI/HI. Denies hallucinations and does not appear to be responding to internal stimuli or be internally preoccupied. No manic symptoms noted. Tolerating SGA therapy without serious side effects. No NMS s/s. No EPS or TD symptoms noted. AIMS=0.        Objective:     Vitals:  Vitals:    09/06/24 1002   BP: 128/78   BP Location: Right arm   Patient Position: Sitting   BP Method: X-Large (Manual)   Pulse: 79   Temp: 99.1 °F (37.3 °C)   TempSrc: Temporal   SpO2: 100%   Weight: 132 kg (291 lb 0.1 oz)   Height: 4' 9.99" (1.473 m)       Wt Readings from Last 3 Encounters:   09/06/24 1002 132 kg (291 lb 0.1 oz)   08/05/24 1433 133.2 kg (293 lb 10.4 oz)   07/30/24 1112 131.5 kg (290 lb)         Medication:    Current Outpatient Medications:     acetaZOLAMIDE (DIAMOX) 250 MG tablet, Take 500 mg by mouth 2 (two) times daily., Disp: , Rfl:     cetirizine (ZYRTEC) 10 MG tablet, Take 10 mg by mouth once daily., Disp: , Rfl:     cholecalciferol, vitamin D3, 1,250 mcg (50,000 unit) Tab, Take 1,250 mcg by mouth every 7 days., Disp: 12 tablet, Rfl: 3   "  lamoTRIgine (LAMICTAL) 200 MG tablet, Take 1,000 mg by mouth 2 (two) times a day. 3 tablets(600mg) in am and 3(600mg) tablets in pm, Disp: , Rfl:     levonorgestreL (MIRENA) 20 mcg/24 hours (8 yrs) 52 mg IUD, 1 each by Intrauterine route once., Disp: , Rfl:     levothyroxine (SYNTHROID) 125 MCG tablet, TAKE 1 TABLET EVERY MORNING ON AN EMPTY STOMACH FOR THYROID, Disp: 30 tablet, Rfl: 5    losartan (COZAAR) 100 MG tablet, Take 1 tablet (100 mg total) by mouth once daily. FOR BLOOD PRESSURE, Disp: 90 tablet, Rfl: 3    pantoprazole (PROTONIX) 40 MG tablet, Take 1 tablet (40 mg total) by mouth once daily., Disp: 90 tablet, Rfl: 3    phenytoin (DILANTIN) 100 MG ER capsule, Take 500 mg by mouth once daily. 300mg po am-200mg po hs c 50mg for total of 250mg po hs, Disp: , Rfl:     phenytoin (DILANTIN) 50 mg chewable tablet, Take 50 mg by mouth every evening., Disp: , Rfl:     hydrOXYzine (ATARAX) 50 MG tablet, Take 1 tablet (50 mg total) by mouth 4 (four) times daily as needed for Itching., Disp: 120 tablet, Rfl: 0    lurasidone (LATUDA) 80 mg Tab tablet, Take 1 tablet (80 mg total) by mouth once daily., Disp: 30 tablet, Rfl: 0    sertraline (ZOLOFT) 100 MG tablet, Take 2 tablets (200 mg total) by mouth once daily., Disp: 60 tablet, Rfl: 0       Significant Labs: - none at this time    Significant Imaging: - none at this time    Physical Exam  Vitals and nursing note reviewed.   Constitutional:       General: She is awake.      Appearance: Normal appearance.   Musculoskeletal:      Comments: Full ROM   Neurological:      Mental Status: She is alert.   Psychiatric:         Attention and Perception: Attention and perception normal. She does not perceive auditory or visual hallucinations.         Mood and Affect: Affect normal. Mood is anxious.         Speech: Speech normal.         Behavior: Behavior is cooperative.         Thought Content: Thought content does not include homicidal or suicidal ideation.         Cognition  "and Memory: Cognition and memory normal.         Judgment: Judgment normal.      Comments: Anxious affect          Review of Systems     Mental Status Exam:  Presentation:  - Appearance: 38 y.o. year old White female, appears stated age, appears Casually dressed and Well groomed  - Motility: Erect when standing, Steady gait, No EPS or Tremors, No psychomotor agitation or retardation appreciated  - Behavior: anxious, cooperative, maintains eye contact  Speech:  - Character/Organization: spontaneous, fluent, normal volume, normal rate, normal rhythm  Emotional State:  - Mood: "anxious"   - Affect: congruent and anxious  Thought:  - Process: logical, linear, organized , goal-directed  - Preoccupations: guilt  - Delusions: no persecutory, paranoid, or grandiose delusions appreciated  - Perception: denies AVH, not actively responding to internal stimuli  - SI/HI: denies/denies  Sensorium & Intellect:  - Sensorium: AAOx4  - Memory: intact to recent and remote events  - Attention/Concentration: good/good  - Insight/Judgement: good/good    Gait: normal swing and stance  MSK:no rigidity appreciated    All other systems without acute issues unless noted in HPI      Assessment/Plan      ICD-10-CM ICD-9-CM    1. Bipolar I disorder  F31.9 296.7 lurasidone (LATUDA) 80 mg Tab tablet      2. Generalized anxiety disorder  F41.1 300.02 hydrOXYzine (ATARAX) 50 MG tablet      sertraline (ZOLOFT) 100 MG tablet      3. Conversion disorder with attacks or seizures  F44.5 300.11          Continue current medications without change    Potential side effects and risks vs benefits of current treatment plan reviewed with patient. Applicable black box warnings reviewed. Encouraged patient not to alter dosages or abruptly discontinue medications without contacting prescriber first, due to risk of worsening symptoms and decompensation of mental status. Warned of risks associated with herbal remedies and supplements while taking psychotropic " medications and of the need to consult prescriber prior to adding any of these to current regimen. Patient should abstain from abuse of alcohol, prescription medications, and illicit drugs. Reviewed when to contact clinic and/or seek emergent care, such as but not limited to, onset/worsening SI/HI, hallucinations, delusions, manic symptoms. Pt verbalized understanding and agreement of these warnings/recommendations and verbally consented to treatment plan.      Follow up in about 4 weeks (around 10/4/2024) for Medication Management.    Saad Combs, YUANP

## 2024-10-04 ENCOUNTER — OFFICE VISIT (OUTPATIENT)
Dept: BEHAVIORAL HEALTH | Facility: CLINIC | Age: 39
End: 2024-10-04
Payer: MEDICAID

## 2024-10-04 VITALS
BODY MASS INDEX: 61.5 KG/M2 | HEART RATE: 83 BPM | WEIGHT: 293 LBS | HEIGHT: 58 IN | SYSTOLIC BLOOD PRESSURE: 126 MMHG | DIASTOLIC BLOOD PRESSURE: 70 MMHG | TEMPERATURE: 99 F | OXYGEN SATURATION: 100 %

## 2024-10-04 DIAGNOSIS — F31.9 BIPOLAR I DISORDER: Primary | ICD-10-CM

## 2024-10-04 DIAGNOSIS — F44.5 CONVERSION DISORDER WITH ATTACKS OR SEIZURES: ICD-10-CM

## 2024-10-04 DIAGNOSIS — F41.1 GENERALIZED ANXIETY DISORDER: ICD-10-CM

## 2024-10-04 DIAGNOSIS — F43.21 GRIEF: ICD-10-CM

## 2024-10-04 RX ORDER — LURASIDONE HYDROCHLORIDE 80 MG/1
80 TABLET, FILM COATED ORAL DAILY
Qty: 30 TABLET | Refills: 0 | Status: SHIPPED | OUTPATIENT
Start: 2024-10-04

## 2024-10-04 RX ORDER — SERTRALINE HYDROCHLORIDE 100 MG/1
200 TABLET, FILM COATED ORAL DAILY
Qty: 60 TABLET | Refills: 0 | Status: SHIPPED | OUTPATIENT
Start: 2024-10-04

## 2024-10-04 RX ORDER — HYDROXYZINE HYDROCHLORIDE 50 MG/1
50 TABLET, FILM COATED ORAL 4 TIMES DAILY PRN
Qty: 120 TABLET | Refills: 0 | Status: SHIPPED | OUTPATIENT
Start: 2024-10-04

## 2024-10-04 NOTE — PROGRESS NOTES
"PSYCHIATRIC FOLLOW-UP VISIT NOTE    Chief Complaint   Patient presents with    Medication Management     4 week medication management         History of Present Illness  38 y.o. year old White female with hx of bipolar disorder, ELIZA, grief, and conversion disorder with seizures seen today for follow-up appointment and medication management.  On September 17th patient had to put her 17-year-old dog down due to persistent infections and incontinence.  She was currently in the acute stage of grief.  She has been coping with this very well.  Has written letters to her dog to cope with her loss.  Also uses classical music.  Denies any morbid ruminations or intrusive thoughts.  Does still well-controlled with current dosage of Latuda.  Has a good support system including her parents and her friend, Joshua.  She is allowing herself to grieve in a healthy manner.  Denies feelings of hopelessness or helplessness.  No anhedonia.  Anxiety has been generally well-controlled. Patient denies SI/HI. Denies hallucinations and does not appear to be responding to internal stimuli or be internally preoccupied. No manic symptoms noted. Tolerating SGA therapy without serious side effects. No NMS s/s. No EPS or TD symptoms noted. AIMS=0.        Objective:     Vitals:  Vitals:    10/04/24 1026   BP: 126/70   BP Location: Right arm   Patient Position: Sitting   Pulse: 83   Temp: 98.8 °F (37.1 °C)   TempSrc: Temporal   SpO2: 100%   Weight: 134.5 kg (296 lb 8.3 oz)   Height: 4' 9.99" (1.473 m)       Wt Readings from Last 3 Encounters:   10/04/24 1026 134.5 kg (296 lb 8.3 oz)   09/06/24 1002 132 kg (291 lb 0.1 oz)   08/05/24 1433 133.2 kg (293 lb 10.4 oz)         Medication:    Current Outpatient Medications:     acetaZOLAMIDE (DIAMOX) 250 MG tablet, Take 500 mg by mouth 2 (two) times daily., Disp: , Rfl:     cetirizine (ZYRTEC) 10 MG tablet, Take 10 mg by mouth once daily., Disp: , Rfl:     lamoTRIgine (LAMICTAL) 200 MG tablet, Take 1,000 mg " by mouth 2 (two) times a day. 3 tablets(600mg) in am and 3(600mg) tablets in pm, Disp: , Rfl:     levonorgestreL (MIRENA) 20 mcg/24 hours (8 yrs) 52 mg IUD, 1 each by Intrauterine route once., Disp: , Rfl:     levothyroxine (SYNTHROID) 125 MCG tablet, TAKE 1 TABLET EVERY MORNING ON AN EMPTY STOMACH FOR THYROID, Disp: 30 tablet, Rfl: 5    losartan (COZAAR) 100 MG tablet, Take 1 tablet (100 mg total) by mouth once daily. FOR BLOOD PRESSURE, Disp: 90 tablet, Rfl: 3    pantoprazole (PROTONIX) 40 MG tablet, Take 1 tablet (40 mg total) by mouth once daily., Disp: 90 tablet, Rfl: 3    phenytoin (DILANTIN) 100 MG ER capsule, Take 500 mg by mouth once daily. 300mg po am-200mg po hs c 50mg for total of 250mg po hs, Disp: , Rfl:     phenytoin (DILANTIN) 50 mg chewable tablet, Take 50 mg by mouth every evening., Disp: , Rfl:     hydrOXYzine (ATARAX) 50 MG tablet, Take 1 tablet (50 mg total) by mouth 4 (four) times daily as needed for Itching., Disp: 120 tablet, Rfl: 0    lurasidone (LATUDA) 80 mg Tab tablet, Take 1 tablet (80 mg total) by mouth once daily., Disp: 30 tablet, Rfl: 0    sertraline (ZOLOFT) 100 MG tablet, Take 2 tablets (200 mg total) by mouth once daily., Disp: 60 tablet, Rfl: 0       Significant Labs: - none at this time    Significant Imaging: - none at this time    Physical Exam  Vitals and nursing note reviewed.   Constitutional:       General: She is awake.      Appearance: Normal appearance.   Musculoskeletal:      Comments: Full ROM   Neurological:      Mental Status: She is alert.   Psychiatric:         Attention and Perception: Attention and perception normal. She does not perceive auditory or visual hallucinations.         Mood and Affect: Affect normal.         Speech: Speech normal.         Behavior: Behavior is cooperative.         Thought Content: Thought content does not include homicidal or suicidal ideation.         Cognition and Memory: Cognition and memory normal.         Judgment: Judgment  "normal.      Comments: Sad mood, grieving          Review of Systems     Mental Status Exam:  Presentation:  - Appearance: 38 y.o. year old White female, appears stated age, appears Casually dressed and Well groomed  - Motility: Erect when standing, Steady gait, No EPS or Tremors, No psychomotor agitation or retardation appreciated  - Behavior: cooperative, maintains eye contact, sullen  Speech:  - Character/Organization: spontaneous, fluent, normal volume, normal rate, normal rhythm  Emotional State:  - Mood: "sad, grieving"   - Affect: congruent and sad  Thought:  - Process: logical, linear, organized , goal-directed  - Preoccupations: loss of 17 year old dog  - Delusions: no persecutory, paranoid, or grandiose delusions appreciated  - Perception: denies AVH, not actively responding to internal stimuli  - SI/HI: denies/denies  Sensorium & Intellect:  - Sensorium: AAOx4  - Memory: intact to recent and remote events  - Attention/Concentration: good/good  - Insight/Judgement: good/good    Gait: normal swing and stance  MSK:no rigidity appreciated    All other systems without acute issues unless noted in HPI      Assessment/Plan      ICD-10-CM ICD-9-CM    1. Bipolar I disorder  F31.9 296.7 lurasidone (LATUDA) 80 mg Tab tablet      2. Generalized anxiety disorder  F41.1 300.02 sertraline (ZOLOFT) 100 MG tablet      hydrOXYzine (ATARAX) 50 MG tablet      3. Conversion disorder with attacks or seizures  F44.5 300.11       4. Grief  F43.21 309.0          Continue current medications without change    Continue current coping strategies to manage acute grief    Potential side effects and risks vs benefits of current treatment plan reviewed with patient. Applicable black box warnings reviewed. Encouraged patient not to alter dosages or abruptly discontinue medications without contacting prescriber first, due to risk of worsening symptoms and decompensation of mental status. Warned of risks associated with herbal remedies and " supplements while taking psychotropic medications and of the need to consult prescriber prior to adding any of these to current regimen. Patient should abstain from abuse of alcohol, prescription medications, and illicit drugs. Reviewed when to contact clinic and/or seek emergent care, such as but not limited to, onset/worsening SI/HI, hallucinations, delusions, manic symptoms. Pt verbalized understanding and agreement of these warnings/recommendations and verbally consented to treatment plan.        Follow up in about 4 weeks (around 11/1/2024) for Medication Management.    Saad Combs, YUANP

## 2024-12-01 DIAGNOSIS — F41.1 GENERALIZED ANXIETY DISORDER: ICD-10-CM

## 2024-12-01 DIAGNOSIS — F31.9 BIPOLAR I DISORDER: ICD-10-CM

## 2024-12-02 RX ORDER — LURASIDONE HYDROCHLORIDE 80 MG/1
80 TABLET, FILM COATED ORAL
Qty: 30 TABLET | Refills: 0 | Status: SHIPPED | OUTPATIENT
Start: 2024-12-02

## 2024-12-02 RX ORDER — SERTRALINE HYDROCHLORIDE 100 MG/1
TABLET, FILM COATED ORAL
Qty: 60 TABLET | Refills: 0 | Status: SHIPPED | OUTPATIENT
Start: 2024-12-02

## 2024-12-09 ENCOUNTER — OFFICE VISIT (OUTPATIENT)
Dept: BEHAVIORAL HEALTH | Facility: CLINIC | Age: 39
End: 2024-12-09
Payer: MEDICAID

## 2024-12-09 VITALS
SYSTOLIC BLOOD PRESSURE: 132 MMHG | WEIGHT: 293 LBS | BODY MASS INDEX: 61.5 KG/M2 | OXYGEN SATURATION: 100 % | HEART RATE: 93 BPM | HEIGHT: 58 IN | DIASTOLIC BLOOD PRESSURE: 74 MMHG | TEMPERATURE: 99 F

## 2024-12-09 DIAGNOSIS — F43.21 GRIEF: ICD-10-CM

## 2024-12-09 DIAGNOSIS — F41.1 GENERALIZED ANXIETY DISORDER: ICD-10-CM

## 2024-12-09 DIAGNOSIS — F44.5 CONVERSION DISORDER WITH ATTACKS OR SEIZURES: ICD-10-CM

## 2024-12-09 DIAGNOSIS — F31.9 BIPOLAR I DISORDER: Primary | ICD-10-CM

## 2024-12-09 PROCEDURE — 1160F RVW MEDS BY RX/DR IN RCRD: CPT | Mod: CPTII,,, | Performed by: NURSE PRACTITIONER

## 2024-12-09 PROCEDURE — 1159F MED LIST DOCD IN RCRD: CPT | Mod: CPTII,,, | Performed by: NURSE PRACTITIONER

## 2024-12-09 PROCEDURE — 3078F DIAST BP <80 MM HG: CPT | Mod: CPTII,,, | Performed by: NURSE PRACTITIONER

## 2024-12-09 PROCEDURE — 99214 OFFICE O/P EST MOD 30 MIN: CPT | Mod: ,,, | Performed by: NURSE PRACTITIONER

## 2024-12-09 PROCEDURE — 4010F ACE/ARB THERAPY RXD/TAKEN: CPT | Mod: CPTII,,, | Performed by: NURSE PRACTITIONER

## 2024-12-09 PROCEDURE — 3075F SYST BP GE 130 - 139MM HG: CPT | Mod: CPTII,,, | Performed by: NURSE PRACTITIONER

## 2024-12-09 PROCEDURE — 3044F HG A1C LEVEL LT 7.0%: CPT | Mod: CPTII,,, | Performed by: NURSE PRACTITIONER

## 2024-12-09 PROCEDURE — 3008F BODY MASS INDEX DOCD: CPT | Mod: CPTII,,, | Performed by: NURSE PRACTITIONER

## 2024-12-09 RX ORDER — HYDROXYZINE HYDROCHLORIDE 50 MG/1
50 TABLET, FILM COATED ORAL 4 TIMES DAILY PRN
Qty: 120 TABLET | Refills: 1 | Status: SHIPPED | OUTPATIENT
Start: 2024-12-09

## 2024-12-09 RX ORDER — SERTRALINE HYDROCHLORIDE 100 MG/1
200 TABLET, FILM COATED ORAL DAILY
Qty: 60 TABLET | Refills: 1 | Status: SHIPPED | OUTPATIENT
Start: 2024-12-09

## 2024-12-09 RX ORDER — LURASIDONE HYDROCHLORIDE 80 MG/1
80 TABLET, FILM COATED ORAL DAILY
Qty: 30 TABLET | Refills: 1 | Status: SHIPPED | OUTPATIENT
Start: 2024-12-09

## 2024-12-09 NOTE — PROGRESS NOTES
"PSYCHIATRIC FOLLOW-UP VISIT NOTE    Chief Complaint   Patient presents with    Medication Management     Medication management         History of Present Illness  39 y.o. year old White female with hx of bipolar 1 disorder, ELIZA, conversion disorder with seizures, and grief seen today for follow-up appointment and medication management.  Patient reports that she has been doing quite well since our last visit.  Depression and anxiety have both been minimal.  Describes mood as stable, no recent increased impulsivity, increased irritability, or decreased need for sleep.  Denies any recent panic attacks.  Has had some increased physical health issues but has had some improvement following recent medication changes.  She has also been focusing on improving her interpersonal relationships.  Was able to have a good Thanksgiving with her family and friends.  She denies any side effects from current medication regimen. Patient denies SI/HI. Denies hallucinations and does not appear to be responding to internal stimuli or be internally preoccupied. No manic symptoms noted. Tolerating SGA therapy without serious side effects. No NMS s/s. No EPS or TD symptoms noted. AIMS=0.        Objective:     Vitals:  Vitals:    12/09/24 0935   BP: 132/74   BP Location: Right arm   Patient Position: Sitting   Pulse: 93   Temp: 98.8 °F (37.1 °C)   TempSrc: Temporal   SpO2: 100%   Weight: 134.7 kg (296 lb 15.4 oz)   Height: 4' 9.99" (1.473 m)       Wt Readings from Last 3 Encounters:   12/09/24 0935 134.7 kg (296 lb 15.4 oz)   10/04/24 1026 134.5 kg (296 lb 8.3 oz)   09/06/24 1002 132 kg (291 lb 0.1 oz)         Medication:    Current Outpatient Medications:     acetaZOLAMIDE (DIAMOX) 250 MG tablet, Take 250 mg by mouth 3 (three) times daily. 4 tablets in am , 2 tablets in afternoon and 4 tablets at night, Disp: , Rfl:     cetirizine (ZYRTEC) 10 MG tablet, Take 10 mg by mouth once daily., Disp: , Rfl:     lamoTRIgine (LAMICTAL) 200 MG tablet, " Take 1,000 mg by mouth 2 (two) times a day. 3 tablets(600mg) in am and 3(600mg) tablets in pm, Disp: , Rfl:     levonorgestreL (MIRENA) 20 mcg/24 hours (8 yrs) 52 mg IUD, 1 each by Intrauterine route once., Disp: , Rfl:     levothyroxine (SYNTHROID) 125 MCG tablet, TAKE 1 TABLET EVERY MORNING ON AN EMPTY STOMACH FOR THYROID, Disp: 30 tablet, Rfl: 5    losartan (COZAAR) 100 MG tablet, Take 1 tablet (100 mg total) by mouth once daily. FOR BLOOD PRESSURE, Disp: 90 tablet, Rfl: 3    pantoprazole (PROTONIX) 40 MG tablet, Take 1 tablet (40 mg total) by mouth once daily., Disp: 90 tablet, Rfl: 3    phenytoin (DILANTIN) 100 MG ER capsule, Take 500 mg by mouth once daily. 300mg po am-200mg po hs c 50mg for total of 250mg po hs, Disp: , Rfl:     phenytoin (DILANTIN) 50 mg chewable tablet, Take 50 mg by mouth every evening., Disp: , Rfl:     hydrOXYzine (ATARAX) 50 MG tablet, Take 1 tablet (50 mg total) by mouth 4 (four) times daily as needed for Itching., Disp: 120 tablet, Rfl: 1    lurasidone (LATUDA) 80 mg Tab tablet, Take 1 tablet (80 mg total) by mouth once daily., Disp: 30 tablet, Rfl: 1    sertraline (ZOLOFT) 100 MG tablet, Take 2 tablets (200 mg total) by mouth once daily., Disp: 60 tablet, Rfl: 1       Significant Labs: - none at this time    Significant Imaging: - none at this time    Physical Exam  Vitals and nursing note reviewed.   Constitutional:       General: She is awake.      Appearance: Normal appearance.   Musculoskeletal:      Comments: Full ROM   Neurological:      Mental Status: She is alert.   Psychiatric:         Attention and Perception: Attention and perception normal. She does not perceive auditory or visual hallucinations.         Mood and Affect: Affect normal.         Speech: Speech normal.         Behavior: Behavior is cooperative.         Thought Content: Thought content does not include homicidal or suicidal ideation.         Cognition and Memory: Cognition and memory normal.          Review  "of Systems     Mental Status Exam:  Presentation:  - Appearance: 39 y.o. year old White female, appears stated age, appears Casually dressed and Well groomed  - Motility: Erect when standing, Steady gait, No EPS or Tremors, No psychomotor agitation or retardation appreciated  - Behavior: calm, cooperative, good eye contact  Speech:  - Character/Organization: spontaneous, fluent, normal volume, normal rate, normal rhythm  Emotional State:  - Mood: "happy"   - Affect: congruent and appropriate  Thought:  - Process: logical, linear, organized , goal-directed  - Preoccupations: no ruminations, rituals, or phobias appreciated  - Delusions: no persecutory, paranoid, or grandiose delusions appreciated  - Perception: denies AVH, not actively responding to internal stimuli  - SI/HI: denies/denies  Sensorium & Intellect:  - Sensorium: AAOx4  - Memory: intact to recent and remote events  - Attention/Concentration: good/good  - Insight/Judgement: good/good    Gait: normal swing and stance  MSK:no rigidity appreciated    All other systems without acute issues unless noted in HPI      Assessment/Plan      ICD-10-CM ICD-9-CM    1. Bipolar I disorder  F31.9 296.7 lurasidone (LATUDA) 80 mg Tab tablet      2. Generalized anxiety disorder  F41.1 300.02 hydrOXYzine (ATARAX) 50 MG tablet      sertraline (ZOLOFT) 100 MG tablet      3. Conversion disorder with attacks or seizures  F44.5 300.11       4. Grief  F43.21 309.0          Continue current medications without change    Potential side effects and risks vs benefits of current treatment plan reviewed with patient. Applicable black box warnings reviewed. Encouraged patient not to alter dosages or abruptly discontinue medications without contacting prescriber first, due to risk of worsening symptoms and decompensation of mental status. Warned of risks associated with herbal remedies and supplements while taking psychotropic medications and of the need to consult prescriber prior to " adding any of these to current regimen. Patient should abstain from abuse of alcohol, prescription medications, and illicit drugs. Reviewed when to contact clinic and/or seek emergent care, such as but not limited to, onset/worsening SI/HI, hallucinations, delusions, manic symptoms. Pt verbalized understanding and agreement of these warnings/recommendations and verbally consented to treatment plan.      Follow up in about 2 months (around 2/9/2025) for Medication Management.        Saad Combs, YUANP

## 2025-01-28 DIAGNOSIS — I10 HYPERTENSION, UNSPECIFIED TYPE: ICD-10-CM

## 2025-01-28 RX ORDER — LOSARTAN POTASSIUM 100 MG/1
100 TABLET ORAL
Qty: 90 TABLET | Refills: 3 | Status: SHIPPED | OUTPATIENT
Start: 2025-01-28

## 2025-01-29 ENCOUNTER — OFFICE VISIT (OUTPATIENT)
Dept: FAMILY MEDICINE | Facility: CLINIC | Age: 40
End: 2025-01-29
Payer: MEDICAID

## 2025-01-29 VITALS
HEIGHT: 58 IN | SYSTOLIC BLOOD PRESSURE: 122 MMHG | WEIGHT: 293 LBS | HEART RATE: 72 BPM | DIASTOLIC BLOOD PRESSURE: 72 MMHG | OXYGEN SATURATION: 99 % | BODY MASS INDEX: 61.5 KG/M2 | TEMPERATURE: 97 F

## 2025-01-29 DIAGNOSIS — G43.909 MIGRAINE WITHOUT STATUS MIGRAINOSUS, NOT INTRACTABLE, UNSPECIFIED MIGRAINE TYPE: ICD-10-CM

## 2025-01-29 DIAGNOSIS — E03.9 ACQUIRED HYPOTHYROIDISM: ICD-10-CM

## 2025-01-29 DIAGNOSIS — G93.2 BENIGN INTRACRANIAL HYPERTENSION: ICD-10-CM

## 2025-01-29 DIAGNOSIS — I10 PRIMARY HYPERTENSION: ICD-10-CM

## 2025-01-29 DIAGNOSIS — G40.909 NONINTRACTABLE EPILEPSY WITHOUT STATUS EPILEPTICUS, UNSPECIFIED EPILEPSY TYPE: Primary | ICD-10-CM

## 2025-01-29 DIAGNOSIS — K21.9 GASTROESOPHAGEAL REFLUX DISEASE, UNSPECIFIED WHETHER ESOPHAGITIS PRESENT: ICD-10-CM

## 2025-01-29 DIAGNOSIS — E66.01 MORBID OBESITY: ICD-10-CM

## 2025-01-29 PROCEDURE — 3074F SYST BP LT 130 MM HG: CPT | Mod: CPTII,,, | Performed by: NURSE PRACTITIONER

## 2025-01-29 PROCEDURE — 3078F DIAST BP <80 MM HG: CPT | Mod: CPTII,,, | Performed by: NURSE PRACTITIONER

## 2025-01-29 PROCEDURE — 99214 OFFICE O/P EST MOD 30 MIN: CPT | Mod: ,,, | Performed by: NURSE PRACTITIONER

## 2025-01-29 PROCEDURE — 1160F RVW MEDS BY RX/DR IN RCRD: CPT | Mod: CPTII,,, | Performed by: NURSE PRACTITIONER

## 2025-01-29 PROCEDURE — 1159F MED LIST DOCD IN RCRD: CPT | Mod: CPTII,,, | Performed by: NURSE PRACTITIONER

## 2025-01-29 PROCEDURE — 3008F BODY MASS INDEX DOCD: CPT | Mod: CPTII,,, | Performed by: NURSE PRACTITIONER

## 2025-01-29 PROCEDURE — 4010F ACE/ARB THERAPY RXD/TAKEN: CPT | Mod: CPTII,,, | Performed by: NURSE PRACTITIONER

## 2025-01-29 NOTE — PROGRESS NOTES
Patient ID: [id number]     Chief Complaint: Migraine and Seizures      Subjective:     Ebony Richard is a 39 y.o. female in the office for Migraine and Seizures      History of Present Illness    CHIEF COMPLAINT:  Patient presents today for follow up of seizures and migraines    SEIZURES:  She reports seizures are well controlled with last seizure occurring more than one month ago. She continues to follow with Dr. Alaniz for management.    MIGRAINES AND ASSOCIATED SYMPTOMS:  Her migraines are well controlled when taking Diamox 250mg 8 tablets daily, with symptoms nearly resolved at this dosage. She experiences significant photophobia with headaches, reporting light sensitivity persists even with eyes closed. Her Diamox dose was increased with a range to try. Eight 250mg tablets works best for symptom control, but was informed this is not a permanent solution due to the high dosage. She is currently taking 6 tablets daily and is slowly increasing from 5 tablets to eventually reach 8 tablets again.    NEUROLOGICAL SYMPTOMS:  She reports experiencing balance issues when walking, describing it as getting off balance. These balance problems developed over the past couple of months. She also reports blurred vision, which was her initial symptom and preceded the balance issues. She denies any connection between these symptoms and Diamox use.    GASTROINTESTINAL:  She continues to experience heartburn and reflux symptoms primarily in the morning despite taking Protonix. She notes that Protonix was initially helpful in managing symptoms when first started.    MENTAL HEALTH:  Her mental health is stable and continues to see Vicente for management. She denies depression, recognizing these symptoms from past experience. She acknowledges reduced outdoor activities and social engagement, but attributes this to physical symptoms such as dizziness and fall risk rather than mood-related factors.    SOCIAL HISTORY:  She is a  current smoker who self-monitors blood pressure at home. She reports recent weight gain and acknowledges difficulty maintaining weight through dietary habits.      ROS:  ROS as indicated in HPI.         Past Medical History:  has a past medical history of Anxiety, Bipolar disorder, Epilepsy, unspecified, not intractable, with status epilepticus, GERD (gastroesophageal reflux disease), Hematochezia, Hyperlipidemia, Hypertension, Hypothyroidism, Intracranial hypertension, Long term use of drug, Loose stools, Migraines, Obesity, unspecified, and Schizoaffective disorder.    Social History:  reports that she has been smoking cigarettes. She started smoking about 7 years ago. She has a 3.7 pack-year smoking history. She has been exposed to tobacco smoke. She has never used smokeless tobacco. She reports that she does not drink alcohol and does not use drugs.    Current Outpatient Medications   Medication Instructions    acetaZOLAMIDE (DIAMOX) 250 mg, 3 times daily    cetirizine (ZYRTEC) 10 mg, Daily    hydrOXYzine (ATARAX) 50 mg, Oral, 4 times daily PRN    lamoTRIgine (LAMICTAL) 1,000 mg, 2 times daily    levonorgestreL (MIRENA) 20 mcg/24 hours (8 yrs) 52 mg IUD 1 each, Once    levothyroxine (SYNTHROID) 125 MCG tablet TAKE 1 TABLET EVERY MORNING ON AN EMPTY STOMACH FOR THYROID    losartan (COZAAR) 100 mg, Oral, FOR BLOOD PRESSURE    lurasidone (LATUDA) 80 mg, Oral, Daily    pantoprazole (PROTONIX) 40 mg, Oral    phenytoin (DILANTIN) 50 mg, Nightly    phenytoin (DILANTIN) 500 mg, Daily    sertraline (ZOLOFT) 200 mg, Oral, Daily       Patient is allergic to codeine.     Patient Care Team:  Evelyn Chun FNP-C as PCP - General (Family Medicine)  Benji Alaniz MD (Neurology)  Saad Combs PMHNP as Nurse Practitioner (Psychiatry)  Brenda Heart NP as Nurse Practitioner (Obstetrics and Gynecology)       Objective     Visit Vitals  /72 (BP Location: Left arm)   Pulse 72   Temp 96.8 °F  "(36 °C) (Temporal)   Ht 4' 9.99" (1.473 m)   Wt 135.6 kg (299 lb)   SpO2 99%   BMI 62.51 kg/m²       Physical Exam  Vitals reviewed.   Constitutional:       Appearance: Normal appearance. She is morbidly obese.   HENT:      Head: Normocephalic and atraumatic.   Cardiovascular:      Rate and Rhythm: Normal rate and regular rhythm.      Heart sounds: Normal heart sounds.   Pulmonary:      Effort: Pulmonary effort is normal.      Breath sounds: Normal breath sounds.   Musculoskeletal:      Right lower leg: No edema.      Left lower leg: No edema.      Comments: Leg length discrepancy, + pelvic tilt   Skin:     General: Skin is warm and dry.   Neurological:      Mental Status: She is alert and oriented to person, place, and time.   Psychiatric:         Mood and Affect: Mood normal.         Behavior: Behavior normal.         Assessment & Plan:     1. Nonintractable epilepsy without status epilepticus, unspecified epilepsy type  Overview:  On lamotrigine 1000 mg bid, dilantin 500 mg daily      2. Benign intracranial hypertension  Overview:  On diamox but temorary solution. Established with Dr. Alaniz.  He's considering a shunt.       3. Primary hypertension  Overview:  On losartan.      4. Acquired hypothyroidism  Overview:  On levothyroxine 125 mcg      5. Gastroesophageal reflux disease, unspecified whether esophagitis present    6. Morbid obesity    7. Migraine without status migrainosus, not intractable, unspecified migraine type  Overview:  At dose of diamox 250 mg (8 tabs daily) migraines are gone.            Assessment & Plan    E66.01 Morbid obesity  G40.909 Nonintractable epilepsy without status epilepticus, unspecified epilepsy type  G93.2 Benign intracranial hypertension  I10 Primary hypertension  E03.9 Acquired hypothyroidism  K21.9 Gastroesophageal reflux disease, unspecified whether esophagitis present  G43.909 Migraine without status migrainosus, not intractable, unspecified migraine " type  IMPRESSION:  - Reviewed patient's seizure control, noting last seizure occurred over 1 month ago  - Discussed recent changes in Diamox dosage by Dr. Alaniz for intracranial hypertension management  - Considered potential need for shunt placement if current treatment proves ineffective  - Evaluated migraine control in relation to Diamox dosage  - Assessed blood pressure management in context of patient's smoking habit  - Reviewed weight management concerns and its impact on medication requirements  - Evaluated ongoing GERD symptoms despite Protonix use  - Considered impact of CPAP use on GERD symptoms  - Discussed patient's concerns about balance issues, distinguishing between medication side effects and pre-existing condition    E66.01 MORBID OBESITY:  - Educated the patient about the relationship between weight and medication requirements for seizure management, emphasizing the importance of weight management for overall health and treatment efficacy.    G40.909 NONINTRACTABLE EPILEPSY WITHOUT STATUS EPILEPTICUS, UNSPECIFIED EPILEPSY TYPE:  - Explained the correlation between body weight and medication dosage requirements for effective seizure management.  - Emphasized the importance of maintaining a consistent medication regimen for optimal seizure control.    G93.2 BENIGN INTRACRANIAL HYPERTENSION:  - Increased Diamox dosage: Instructed the patient to gradually increase from current 6 tablets daily to 8 tablets daily (250mg each) as tolerated.  - Advised the patient to monitor for any side effects and report them promptly.  - Scheduled a follow-up visit to assess the effectiveness of the increased dosage.    K21.9 GASTROESOPHAGEAL REFLUX DISEASE, UNSPECIFIED WHETHER ESOPHAGITIS PRESENT:  - Continued Protonix at the current dose for GERD management.  - Discussed the potential connection between CPAP use and GERD symptoms.  - Recommend lifestyle modifications to help alleviate symptoms, such as elevating  the head of the bed and avoiding trigger foods.  - Advised the patient to report any worsening of symptoms or new concerns related to GERD.          Follow up for Keep scheduled appointment. In addition to their next scheduled appointment, the patient has also been instructed to follow up on as needed basis.     Future Appointments   Date Time Provider Department Center   2/6/2025 10:40 AM [facility]   5/6/2025  1:00 PM Brenda Heart, NP [facility]   7/28/2025  8:20 AM LAB, [facility] LABORATORY DRAW STATION [facility]   8/4/2025 10:00 AM Evelyn Chun FNP-[facility]        Lab Frequency Next Occurrence   Ambulatory referral/consult to General Surgery Once 01/16/2024       This note was generated with the assistance of ambient listening technology. Verbal consent was obtained by the patient and accompanying visitor(s) for the recording of patient appointment to facilitate this note. I attest to having reviewed and edited the generated note for accuracy, though some syntax or spelling errors may persist. Please contact the author of this note for any clarification.

## 2025-01-29 NOTE — PROGRESS NOTES
Patient ID: [id number]     Chief Complaint: Migraine and Seizures      Subjective:     Ebony Richard is a 39 y.o. female in the office for Migraine and Seizures      History of Present Illness              Past Medical History:  has a past medical history of Anxiety, Bipolar disorder, Epilepsy, unspecified, not intractable, with status epilepticus, GERD (gastroesophageal reflux disease), Hematochezia, Hyperlipidemia, Hypertension, Hypothyroidism, Intracranial hypertension, Long term use of drug, Loose stools, Migraines, Obesity, unspecified, and Schizoaffective disorder.    Social History:  reports that she has been smoking cigarettes. She started smoking about 7 years ago. She has a 3.7 pack-year smoking history. She has been exposed to tobacco smoke. She has never used smokeless tobacco. She reports that she does not drink alcohol and does not use drugs.    Current Outpatient Medications   Medication Instructions    acetaZOLAMIDE (DIAMOX) 250 mg, 3 times daily    cetirizine (ZYRTEC) 10 mg, Daily    hydrOXYzine (ATARAX) 50 mg, Oral, 4 times daily PRN    lamoTRIgine (LAMICTAL) 1,000 mg, 2 times daily    levonorgestreL (MIRENA) 20 mcg/24 hours (8 yrs) 52 mg IUD 1 each, Once    levothyroxine (SYNTHROID) 125 MCG tablet TAKE 1 TABLET EVERY MORNING ON AN EMPTY STOMACH FOR THYROID    losartan (COZAAR) 100 mg, Oral, FOR BLOOD PRESSURE    lurasidone (LATUDA) 80 mg, Oral, Daily    pantoprazole (PROTONIX) 40 mg, Oral    phenytoin (DILANTIN) 50 mg, Nightly    phenytoin (DILANTIN) 500 mg, Daily    sertraline (ZOLOFT) 200 mg, Oral, Daily       Patient is allergic to codeine.     Patient Care Team:  Evelyn Chun FNP-C as PCP - General (Family Medicine)  Benji Alaniz MD (Neurology)  Saad Combs PMHNP as Nurse Practitioner (Psychiatry)  Brenda Heart NP as Nurse Practitioner (Obstetrics and Gynecology)       Objective     Visit Vitals  /72 (BP Location: Left arm)   Pulse 72   Temp  "96.8 °F (36 °C) (Temporal)   Ht 4' 9.99" (1.473 m)   Wt 135.6 kg (299 lb)   SpO2 99%   BMI 62.51 kg/m²       Physical Exam    Assessment & Plan:     1. Nonintractable epilepsy without status epilepticus, unspecified epilepsy type  Overview:  On lamotrigine 1000 mg bid, dilantin 500 mg daily      2. Benign intracranial hypertension  Overview:  On diamox but temorary solution. Established with Dr. Alaniz.  He's considering a shunt.       3. Primary hypertension  Overview:  On losartan.      4. Acquired hypothyroidism  Overview:  On levothyroxine 125 mcg      5. Gastroesophageal reflux disease, unspecified whether esophagitis present    6. Morbid obesity    7. Migraine without status migrainosus, not intractable, unspecified migraine type  Overview:  At dose of diamox 250 mg (8 tabs daily) migraines are gone.            Assessment & Plan               No follow-ups on file. In addition to their next scheduled appointment, the patient has also been instructed to follow up on as needed basis.     Future Appointments   Date Time Provider Department Center   2/6/2025 10:40 AM Saad Combs, PMHNP [facility]   5/6/2025  1:00 PM Brenda Heart, NP [facility]   7/28/2025  8:20 AM LAB, [facility] LABORATORY DRAW STATION [facility]   8/4/2025 10:00 AM Evelyn Chun FNP-C [facility]        Lab Frequency Next Occurrence   Ambulatory referral/consult to General Surgery Once 01/16/2024       This note was generated with the assistance of ambient listening technology. Verbal consent was obtained by the patient and accompanying visitor(s) for the recording of patient appointment to facilitate this note. I attest to having reviewed and edited the generated note for accuracy, though some syntax or spelling errors may persist. Please contact the author of this note for any clarification.        "

## 2025-01-29 NOTE — PROGRESS NOTES
Patient ID: [id number]     Chief Complaint: Migraine and Seizures      Subjective:     Ebony Richard is a 39 y.o. female in the office for Migraine and Seizures      History of Present Illness              Past Medical History:  has a past medical history of Anxiety, Bipolar disorder, Epilepsy, unspecified, not intractable, with status epilepticus, GERD (gastroesophageal reflux disease), Hematochezia, Hyperlipidemia, Hypertension, Hypothyroidism, Intracranial hypertension, Long term use of drug, Loose stools, Migraines, Obesity, unspecified, and Schizoaffective disorder.    Social History:  reports that she has been smoking cigarettes. She started smoking about 7 years ago. She has a 3.7 pack-year smoking history. She has been exposed to tobacco smoke. She has never used smokeless tobacco. She reports that she does not drink alcohol and does not use drugs.    Current Outpatient Medications   Medication Instructions    acetaZOLAMIDE (DIAMOX) 250 mg, 3 times daily    cetirizine (ZYRTEC) 10 mg, Daily    hydrOXYzine (ATARAX) 50 mg, Oral, 4 times daily PRN    lamoTRIgine (LAMICTAL) 1,000 mg, 2 times daily    levonorgestreL (MIRENA) 20 mcg/24 hours (8 yrs) 52 mg IUD 1 each, Once    levothyroxine (SYNTHROID) 125 MCG tablet TAKE 1 TABLET EVERY MORNING ON AN EMPTY STOMACH FOR THYROID    losartan (COZAAR) 100 mg, Oral, FOR BLOOD PRESSURE    lurasidone (LATUDA) 80 mg, Oral, Daily    pantoprazole (PROTONIX) 40 mg, Oral    phenytoin (DILANTIN) 50 mg, Nightly    phenytoin (DILANTIN) 500 mg, Daily    sertraline (ZOLOFT) 200 mg, Oral, Daily       Patient is allergic to codeine.     Patient Care Team:  Evelyn Chun FNP-C as PCP - General (Family Medicine)  Benji Alaniz MD (Neurology)  Saad Combs PMHNP as Nurse Practitioner (Psychiatry)  Brenda Heart NP as Nurse Practitioner (Obstetrics and Gynecology)       Objective     Visit Vitals  /72 (BP Location: Left arm)   Pulse  "72   Temp 96.8 °F (36 °C) (Temporal)   Ht 4' 9.99" (1.473 m)   Wt 135.6 kg (299 lb)   SpO2 99%   BMI 62.51 kg/m²       Physical Exam    Assessment & Plan:     1. Nonintractable epilepsy without status epilepticus, unspecified epilepsy type  Overview:  On lamotrigine 1000 mg bid, dilantin 500 mg daily      2. Benign intracranial hypertension  Overview:  On diamox 250 mg bid. Established with Dr. Alaniz, waiting for new neurology appointment.       3. Primary hypertension  Overview:  On losartan.      4. Acquired hypothyroidism  Overview:  On levothyroxine 125 mcg      5. Gastroesophageal reflux disease, unspecified whether esophagitis present    6. Morbid obesity    7. Migraine without status migrainosus, not intractable, unspecified migraine type         Assessment & Plan               No follow-ups on file. In addition to their next scheduled appointment, the patient has also been instructed to follow up on as needed basis.     Future Appointments   Date Time Provider Department Center   2/6/2025 10:40 AM Saad Combs, PMHNP [facility]   5/6/2025  1:00 PM Brenda Heart, NP [facility]   7/28/2025  8:20 AM LAB, [facility] [place] LABORATORY DRAW STATION [facility] [place] [facility]   8/4/2025 10:00 AM Evelyn Chun, FNP-C [facility]        Lab Frequency Next Occurrence   Ambulatory referral/consult to General Surgery Once 01/16/2024       This note was generated with the assistance of ambient listening technology. Verbal consent was obtained by the patient and accompanying visitor(s) for the recording of patient appointment to facilitate this note. I attest to having reviewed and edited the generated note for accuracy, though some syntax or spelling errors may persist. Please contact the author of this note for any clarification.        "

## 2025-02-06 ENCOUNTER — OFFICE VISIT (OUTPATIENT)
Dept: BEHAVIORAL HEALTH | Facility: CLINIC | Age: 40
End: 2025-02-06
Payer: MEDICAID

## 2025-02-06 VITALS
TEMPERATURE: 98 F | OXYGEN SATURATION: 100 % | DIASTOLIC BLOOD PRESSURE: 76 MMHG | SYSTOLIC BLOOD PRESSURE: 128 MMHG | BODY MASS INDEX: 61.5 KG/M2 | WEIGHT: 293 LBS | HEIGHT: 58 IN | HEART RATE: 77 BPM

## 2025-02-06 DIAGNOSIS — F43.21 GRIEF: ICD-10-CM

## 2025-02-06 DIAGNOSIS — F31.9 BIPOLAR I DISORDER: Primary | ICD-10-CM

## 2025-02-06 DIAGNOSIS — F41.1 GENERALIZED ANXIETY DISORDER: ICD-10-CM

## 2025-02-06 DIAGNOSIS — F44.5 CONVERSION DISORDER WITH ATTACKS OR SEIZURES: ICD-10-CM

## 2025-02-06 PROCEDURE — 4010F ACE/ARB THERAPY RXD/TAKEN: CPT | Mod: CPTII,,, | Performed by: NURSE PRACTITIONER

## 2025-02-06 PROCEDURE — 1160F RVW MEDS BY RX/DR IN RCRD: CPT | Mod: CPTII,,, | Performed by: NURSE PRACTITIONER

## 2025-02-06 PROCEDURE — 99214 OFFICE O/P EST MOD 30 MIN: CPT | Mod: ,,, | Performed by: NURSE PRACTITIONER

## 2025-02-06 PROCEDURE — 1159F MED LIST DOCD IN RCRD: CPT | Mod: CPTII,,, | Performed by: NURSE PRACTITIONER

## 2025-02-06 PROCEDURE — 3078F DIAST BP <80 MM HG: CPT | Mod: CPTII,,, | Performed by: NURSE PRACTITIONER

## 2025-02-06 PROCEDURE — 3008F BODY MASS INDEX DOCD: CPT | Mod: CPTII,,, | Performed by: NURSE PRACTITIONER

## 2025-02-06 PROCEDURE — 3074F SYST BP LT 130 MM HG: CPT | Mod: CPTII,,, | Performed by: NURSE PRACTITIONER

## 2025-02-06 RX ORDER — LURASIDONE HYDROCHLORIDE 80 MG/1
80 TABLET, FILM COATED ORAL DAILY
Qty: 30 TABLET | Refills: 2 | Status: SHIPPED | OUTPATIENT
Start: 2025-02-06

## 2025-02-06 RX ORDER — SERTRALINE HYDROCHLORIDE 100 MG/1
200 TABLET, FILM COATED ORAL DAILY
Qty: 60 TABLET | Refills: 2 | Status: SHIPPED | OUTPATIENT
Start: 2025-02-06

## 2025-02-06 RX ORDER — HYDROXYZINE HYDROCHLORIDE 50 MG/1
50 TABLET, FILM COATED ORAL 4 TIMES DAILY PRN
Qty: 120 TABLET | Refills: 2 | Status: SHIPPED | OUTPATIENT
Start: 2025-02-06

## 2025-02-06 NOTE — PROGRESS NOTES
"PSYCHIATRIC FOLLOW-UP VISIT NOTE    Chief Complaint   Patient presents with    Medication Management     2 month medication management         History of Present Illness  39 y.o. year old White female with hx of bipolar 1 disorder, generalized anxiety disorder, conversion disorder, and grief seen today for follow-up appointment and medication management.  Patient reports that she has been doing very well since our last visit.  Had a tumultuous holiday season due to her parents having some discord surrounding her father's gambling problem.  She states they have since reconciled their differences and things at home are back to normal.  This has reduced her anxiety significantly.  Denies any depression recently and anxiety has been mild.  She is able to cope with day-to-day stressors as they arise.  Sleeping well at night and feels rested in the morning.  Denies any side effects from current medication regimen. Patient denies SI/HI. Denies hallucinations and does not appear to be responding to internal stimuli or be internally preoccupied. No manic symptoms noted. Tolerating SGA therapy without serious side effects. No NMS s/s. No EPS or TD symptoms noted. AIMS=0.        Objective:     Vitals:  Vitals:    02/06/25 1049   BP: 128/76   BP Location: Right arm   Patient Position: Sitting   Pulse: 77   Temp: 98.1 °F (36.7 °C)   TempSrc: Temporal   SpO2: 100%   Weight: 135.6 kg (298 lb 15.1 oz)   Height: 4' 9.99" (1.473 m)       Wt Readings from Last 3 Encounters:   02/06/25 1049 135.6 kg (298 lb 15.1 oz)   01/29/25 1102 135.6 kg (299 lb)   12/09/24 0935 134.7 kg (296 lb 15.4 oz)         Medication:    Current Outpatient Medications:     acetaZOLAMIDE (DIAMOX) 250 MG tablet, Take 250 mg by mouth 3 (three) times daily. 4 tablets in am , 2 tablets in afternoon and 4 tablets at night, Disp: , Rfl:     cetirizine (ZYRTEC) 10 MG tablet, Take 10 mg by mouth once daily., Disp: , Rfl:     lamoTRIgine (LAMICTAL) 200 MG tablet, Take " 1,000 mg by mouth 2 (two) times a day. 3 tablets(600mg) in am and 3(600mg) tablets in pm, Disp: , Rfl:     levonorgestreL (MIRENA) 20 mcg/24 hours (8 yrs) 52 mg IUD, 1 each by Intrauterine route once., Disp: , Rfl:     levothyroxine (SYNTHROID) 125 MCG tablet, TAKE 1 TABLET EVERY MORNING ON AN EMPTY STOMACH FOR THYROID, Disp: 90 tablet, Rfl: 0    losartan (COZAAR) 100 MG tablet, TAKE 1 TABLET DAILY FOR BLOOD PRESSURE, Disp: 90 tablet, Rfl: 3    pantoprazole (PROTONIX) 40 MG tablet, TAKE 1 TABLET DAILY, Disp: 90 tablet, Rfl: 0    phenytoin (DILANTIN) 100 MG ER capsule, Take 500 mg by mouth once daily. 300mg po am-200mg po hs c 50mg for total of 250mg po hs, Disp: , Rfl:     phenytoin (DILANTIN) 50 mg chewable tablet, Take 50 mg by mouth every evening., Disp: , Rfl:     hydrOXYzine (ATARAX) 50 MG tablet, Take 1 tablet (50 mg total) by mouth 4 (four) times daily as needed for Itching., Disp: 120 tablet, Rfl: 2    lurasidone (LATUDA) 80 mg Tab tablet, Take 1 tablet (80 mg total) by mouth once daily., Disp: 30 tablet, Rfl: 2    sertraline (ZOLOFT) 100 MG tablet, Take 2 tablets (200 mg total) by mouth once daily., Disp: 60 tablet, Rfl: 2       Significant Labs: - none at this time    Significant Imaging: - none at this time    Physical Exam  Vitals and nursing note reviewed.   Constitutional:       General: She is awake.      Appearance: Normal appearance.   Musculoskeletal:      Comments: Full ROM   Neurological:      Mental Status: She is alert.   Psychiatric:         Attention and Perception: Attention and perception normal. She does not perceive auditory or visual hallucinations.         Mood and Affect: Affect normal.         Speech: Speech normal.         Behavior: Behavior is cooperative.         Thought Content: Thought content does not include homicidal or suicidal ideation.         Cognition and Memory: Cognition and memory normal.          Review of Systems     Mental Status Exam:  Presentation:  - Appearance:  "39 y.o. year old White female, appears stated age, appears Casually dressed and Well groomed  - Motility: Erect when standing, Steady gait, No EPS or Tremors, No psychomotor agitation or retardation appreciated  - Behavior: calm, cooperative, good eye contact  Speech:  - Character/Organization: spontaneous, fluent, normal volume, normal rate, normal rhythm  Emotional State:  - Mood: "happy"   - Affect: congruent and appropriate  Thought:  - Process: logical, linear, organized , goal-directed  - Preoccupations: no ruminations, rituals, or phobias appreciated  - Delusions: no persecutory, paranoid, or grandiose delusions appreciated  - Perception: denies AVH, not actively responding to internal stimuli  - SI/HI: denies/denies  Sensorium & Intellect:  - Sensorium: AAOx4  - Memory: intact to recent and remote events  - Attention/Concentration: good/good  - Insight/Judgement: good/good    Gait: normal swing and stance  MSK:no rigidity appreciated    All other systems without acute issues unless noted in HPI      Assessment/Plan      ICD-10-CM ICD-9-CM    1. Bipolar I disorder  F31.9 296.7 lurasidone (LATUDA) 80 mg Tab tablet      2. Generalized anxiety disorder  F41.1 300.02 hydrOXYzine (ATARAX) 50 MG tablet      sertraline (ZOLOFT) 100 MG tablet      3. Conversion disorder with attacks or seizures  F44.5 300.11       4. Grief  F43.21 309.0          Continue current medications without change    Potential side effects and risks vs benefits of current treatment plan reviewed with patient. Applicable black box warnings reviewed. Encouraged patient not to alter dosages or abruptly discontinue medications without contacting prescriber first, due to risk of worsening symptoms and decompensation of mental status. Warned of risks associated with herbal remedies and supplements while taking psychotropic medications and of the need to consult prescriber prior to adding any of these to current regimen. Patient should abstain from " abuse of alcohol, prescription medications, and illicit drugs. Reviewed when to contact clinic and/or seek emergent care, such as but not limited to, onset/worsening SI/HI, hallucinations, delusions, manic symptoms. Pt verbalized understanding and agreement of these warnings/recommendations and verbally consented to treatment plan.      Follow up in about 3 months (around 5/6/2025) for Medication Management.        Saad Combs, YUANP

## 2025-03-24 ENCOUNTER — LAB VISIT (OUTPATIENT)
Dept: LAB | Facility: HOSPITAL | Age: 40
End: 2025-03-24
Attending: PSYCHIATRY & NEUROLOGY
Payer: MEDICAID

## 2025-03-24 DIAGNOSIS — R89.2 NONSPECIFIC ABNORMAL TOXICOLOGICAL FINDING: Primary | ICD-10-CM

## 2025-03-24 DIAGNOSIS — Z51.81 ENCOUNTER FOR THERAPEUTIC DRUG MONITORING: ICD-10-CM

## 2025-03-24 PROCEDURE — 80186 ASSAY OF PHENYTOIN FREE: CPT

## 2025-03-24 PROCEDURE — 36415 COLL VENOUS BLD VENIPUNCTURE: CPT

## 2025-03-27 LAB — PHENYTOIN FREE SERPL-MCNC: 1.8 MCG/ML (ref 1–2)

## 2025-03-28 DIAGNOSIS — E03.9 HYPOTHYROIDISM, UNSPECIFIED TYPE: ICD-10-CM

## 2025-03-28 DIAGNOSIS — K27.9 PUD (PEPTIC ULCER DISEASE): ICD-10-CM

## 2025-03-28 RX ORDER — LEVOTHYROXINE SODIUM 125 UG/1
TABLET ORAL
Qty: 90 TABLET | Refills: 1 | Status: SHIPPED | OUTPATIENT
Start: 2025-03-28

## 2025-03-28 RX ORDER — PANTOPRAZOLE SODIUM 40 MG/1
40 TABLET, DELAYED RELEASE ORAL
Qty: 90 TABLET | Refills: 1 | Status: SHIPPED | OUTPATIENT
Start: 2025-03-28

## 2025-04-02 ENCOUNTER — TELEPHONE (OUTPATIENT)
Dept: FAMILY MEDICINE | Facility: CLINIC | Age: 40
End: 2025-04-02
Payer: MEDICAID

## 2025-04-02 DIAGNOSIS — M21.70 LEG LENGTH DISCREPANCY: Primary | ICD-10-CM

## 2025-04-25 ENCOUNTER — TELEPHONE (OUTPATIENT)
Dept: FAMILY MEDICINE | Facility: CLINIC | Age: 40
End: 2025-04-25
Payer: MEDICAID

## 2025-04-25 DIAGNOSIS — M21.70 LEG LENGTH DISCREPANCY: ICD-10-CM

## 2025-05-06 ENCOUNTER — OFFICE VISIT (OUTPATIENT)
Dept: OBSTETRICS AND GYNECOLOGY | Facility: CLINIC | Age: 40
End: 2025-05-06
Payer: MEDICAID

## 2025-05-06 VITALS
BODY MASS INDEX: 61.5 KG/M2 | DIASTOLIC BLOOD PRESSURE: 60 MMHG | WEIGHT: 293 LBS | HEIGHT: 58 IN | SYSTOLIC BLOOD PRESSURE: 130 MMHG

## 2025-05-06 DIAGNOSIS — Z01.419 WELL WOMAN EXAM: Primary | ICD-10-CM

## 2025-05-06 DIAGNOSIS — Z97.5 IUD (INTRAUTERINE DEVICE) IN PLACE: ICD-10-CM

## 2025-05-06 DIAGNOSIS — E66.01 MORBID OBESITY WITH BMI OF 60.0-69.9, ADULT: ICD-10-CM

## 2025-05-06 DIAGNOSIS — Z12.4 SCREENING FOR MALIGNANT NEOPLASM OF THE CERVIX: ICD-10-CM

## 2025-05-06 NOTE — PROGRESS NOTES
Chief Complaint: Annual exam    Chief Complaint   Patient presents with    Well Woman     Wellness       HPI:    39 y.o. F  presents for an annual gyn exam.  Currently has Mirena IUD, inserted 2022 for contraception.  Reports unable to check for IUD string due to weight gain.    Cancer-related family history is negative for Breast cancer, Cervical cancer, Uterine cancer, and Ovarian cancer.       Labs / Significant Studies:  Gyn History:    Menstrual History  Cycle: No  Menarche Age: 9 years  No Cycle Reason: Medical Suppression  Medical Suppression Reason: IUD    Menopause  Menopause Age: 1 years    Pap History  Last pap date: 24  Result: Normal  History of Abnormal Pap: No  HPV Vaccine Completed: Yes  HPV Vaccine Injection Type: 3 Injection Series    [place]  Sexually Active: No  STI History: No  Contraception: Yes  Contraception Type: IUD    Breast History  Last Breast Imaging Date: Yes  History of Abnormal Breast Imaging : No  History of Breast Biopsy: No    Family History   Problem Relation Name Age of Onset    Diabetes type II Mother      Hypertension Mother      Autoimmune disease Mother      Diabetes type II Father      Hypertension Father      No Known Problems Sister      Depression Brother      Colon cancer Maternal Aunt      Colon cancer Maternal Aunt      No Known Problems Paternal Aunt      No Known Problems Maternal Uncle      No Known Problems Paternal Uncle      Schizophrenia Maternal Grandfather      No Known Problems Maternal Grandmother      No Known Problems Paternal Grandfather      No Known Problems Paternal Grandmother      Bipolar disorder Cousin      No Known Problems Other      Breast cancer Neg Hx      Cervical cancer Neg Hx      Uterine cancer Neg Hx      Ovarian cancer Neg Hx           Past Medical History:   Diagnosis Date    Anxiety     Bipolar disorder     Epilepsy, unspecified, not intractable, with status epilepticus     GERD (gastroesophageal reflux disease)  "    Hematochezia     Hyperlipidemia     Hypertension     Hypothyroidism     Intracranial hypertension     Long term use of drug     Loose stools     Migraines     Obesity, unspecified     Schizoaffective disorder      Past Surgical History:   Procedure Laterality Date    CHOLECYSTECTOMY      esophagesophagostomy      INTRAUTERINE DEVICE INSERTION  05/25/2022    TONSILLECTOMY       Current Medications[1]    Review of patient's allergies indicates:   Allergen Reactions    Codeine Itching     unknown       Social History[2]    Review of Systems:  General/Constitutional: Chills denies. Fatigue/weakness denies. Fever denies. Night sweats denies. Hot flashes denies    Respiratory: Cough denies. Hemoptysis denies. SOB denies. Sputum production denies. Wheezing denies .   Cardiovascular: Chest pain denies . Dizziness denies. Palpitations denies. Swelling in hands/feet denies    Gastrointestinal: Abdominal pain denies. Blood in stool denies. Constipation denies. Diarrhea denies. Heartburn denies. Nausea denies. Vomiting denies    Genitourinary: Incontinence denies. Blood in urine denies. Frequent urination denies. Painful urination denies. Urinary urgency denies. Nocturia denies    Gynecologic: Irregular menses denies. Heavy bleeding denies. Painful menses denies. Vaginal discharge denies. Vaginal odor denies. Vaginal itching denies. Vaginal lesion denies. Pelvic pain denies. Decreased libido denies. Vulvar lesion denies. Prolapse of genital organs denies. Painful intercourse denies. Postcoital bleeding denies    Psychiatric: Depression denies. Anxiety denies     Physical Exam:   Vitals:    05/06/25 1308   BP: 130/60   BP Location: Left arm   Patient Position: Sitting   Weight: 133.2 kg (293 lb 9.6 oz)   Height: 4' 10" (1.473 m)       Body mass index is 61.36 kg/m².       Chaperone: present.     General appearance: morbidly obese, well-nourished and well-developed     Psychiatric: Orientation to time, place and person. " Normal mood and affect and active, alert     Skin: Appearance: no rashes or lesions.     Neck:   Neck: supple, FROM, trachea midline. and no masses   Thyroid: no enlargement or nodules and non-tender.       Cardiovascular:   Auscultation: RRR and no murmur.   Peripheral Vascular: no varicosities, LLE edema, RLE edema, calf tenderness, and palpable cord and pedal pulses intact.     Lungs:   Respiratory effort: no intercostal retractions or accessory muscle usage.   Auscultation: no wheezing, rales/crackles, or rhonchi and clear to auscultation.     Breast:   Inspection/Palpation: no tenderness, discrete/distinct masses, skin changes, or abnormal secretions. Nipple appearance normal.     Abdomen:   Auscultation/Inspection/Palpation: no hepatomegaly, splenomegaly, masses, tenderness or CVA tenderness and soft, non-distended bowel sounds preset.    Hernia: no palpable hernias.     Female Genitalia:    Vulva: no masses, tenderness or lesions    Bladder/Urethra: no urethral discharge or mass, normal meatus, bladder non-distended.    Vagina: no tenderness, erythema, cystocele, rectocele, abnormal vaginal discharge or vesicle(s) or ulcers    Cervix: no discharge, no cervical lacerations noted or motion tenderness and grossly normal    Uterus: normal size and shape and midline, non-tender, and no uterine prolapse.    Adnexa/Parametria: no parametrial tenderness or mass, no adnexal tenderness or ovarian mass.     Lymph Nodes:   Palpation: non tender submandibular nodes, axillary nodes, or inguinal nodes.     Rectal Exam:   Rectum: normal perianal skin.       Assessment:     Problem List[3]    Health Maintenance Due   Topic Date Due    Pneumococcal Vaccines (Age 0-49) (1 of 2 - PCV) Never done    TETANUS VACCINE  06/22/2020    COVID-19 Vaccine (4 - 2024-25 season) 09/01/2024    Cervical Cancer Screening  04/30/2025     Health Maintenance Topics with due status: Not Due       Topic Last Completion Date    Influenza Vaccine  12/15/2023    Hemoglobin A1c (Diabetic Prevention Screening) 07/16/2024    RSV Vaccine (Age 60+ and Pregnant patients) Not Due         Plan:    Ebony was seen today for well woman.    Diagnoses and all orders for this visit:    Well woman exam  PAP  Counseled regarding safe sex practices and prevention of STD's .  Discussed contraception options.  Advised avoidance of tobacco, alcohol, and drugs.  Discussed breast self-awareness  Seat belt  Multivitamin, Ca/Vit D  Healthy diet and exercise  RTC 1 yr   IUD (intrauterine device) in place  Screening for malignant neoplasm of the cervix  Morbid obesity with BMI of 60.0-69.9, adult  Body mass index is 61.36 kg/m². Morbid obesity complicates all aspects of disease management from diagnostic modalities to treatment. Weight loss encouraged and health benefits explained to patient.                  [1]   Current Outpatient Medications:     acetaZOLAMIDE (DIAMOX) 250 MG tablet, Take 250 mg by mouth 3 (three) times daily. 4 tablets in am , 2 tablets in afternoon and 4 tablets at night, Disp: , Rfl:     cetirizine (ZYRTEC) 10 MG tablet, Take 10 mg by mouth once daily., Disp: , Rfl:     hydrOXYzine (ATARAX) 50 MG tablet, Take 1 tablet (50 mg total) by mouth 4 (four) times daily as needed for Itching., Disp: 120 tablet, Rfl: 2    lamoTRIgine (LAMICTAL) 200 MG tablet, Take 1,000 mg by mouth 2 (two) times a day. 3 tablets(600mg) in am and 3(600mg) tablets in pm, Disp: , Rfl:     levonorgestreL (MIRENA) 20 mcg/24 hours (8 yrs) 52 mg IUD, 1 each by Intrauterine route once., Disp: , Rfl:     levothyroxine (SYNTHROID) 125 MCG tablet, TAKE 1 TABLET EVERY MORNING ON AN EMPTY STOMACH FOR THYROID, Disp: 90 tablet, Rfl: 1    losartan (COZAAR) 100 MG tablet, TAKE 1 TABLET DAILY FOR BLOOD PRESSURE, Disp: 90 tablet, Rfl: 3    lurasidone (LATUDA) 80 mg Tab tablet, Take 1 tablet (80 mg total) by mouth once daily., Disp: 30 tablet, Rfl: 2    pantoprazole (PROTONIX) 40 MG tablet, TAKE 1 TABLET  DAILY, Disp: 90 tablet, Rfl: 1    phenytoin (DILANTIN) 100 MG ER capsule, Take 500 mg by mouth once daily. 300mg po am-200mg po hs c 50mg for total of 250mg po hs, Disp: , Rfl:     phenytoin (DILANTIN) 50 mg chewable tablet, Take 50 mg by mouth every evening., Disp: , Rfl:     sertraline (ZOLOFT) 100 MG tablet, Take 2 tablets (200 mg total) by mouth once daily., Disp: 60 tablet, Rfl: 2  [2]   Social History  Tobacco Use    Smoking status: Every Day     Current packs/day: 0.50     Average packs/day: 0.5 packs/day for 7.6 years (3.8 ttl pk-yrs)     Types: Cigarettes     Start date: 9/20/2017     Passive exposure: Past    Smokeless tobacco: Never   Substance Use Topics    Alcohol use: Never    Drug use: Never   [3]   Patient Active Problem List  Diagnosis    Bipolar I disorder    Epilepsy    GERD (gastroesophageal reflux disease)    Generalized anxiety disorder    Hyperlipidemia    Hypertension    Hypothyroidism    Migraine headache    Morbid obesity    Schizoaffective disorder    Benign intracranial hypertension    Conversion disorder with attacks or seizures    RAHEL (obstructive sleep apnea)    PUD (peptic ulcer disease)    Leg length discrepancy    Wellness examination

## 2025-05-12 ENCOUNTER — RESULTS FOLLOW-UP (OUTPATIENT)
Dept: OBSTETRICS AND GYNECOLOGY | Facility: CLINIC | Age: 40
End: 2025-05-12

## 2025-05-14 ENCOUNTER — OFFICE VISIT (OUTPATIENT)
Dept: BEHAVIORAL HEALTH | Facility: CLINIC | Age: 40
End: 2025-05-14
Payer: MEDICAID

## 2025-05-14 VITALS
DIASTOLIC BLOOD PRESSURE: 78 MMHG | HEART RATE: 81 BPM | HEIGHT: 58 IN | WEIGHT: 293 LBS | SYSTOLIC BLOOD PRESSURE: 126 MMHG | OXYGEN SATURATION: 100 % | TEMPERATURE: 99 F | BODY MASS INDEX: 61.5 KG/M2

## 2025-05-14 DIAGNOSIS — F44.5 CONVERSION DISORDER WITH ATTACKS OR SEIZURES: ICD-10-CM

## 2025-05-14 DIAGNOSIS — F31.9 BIPOLAR I DISORDER: Primary | ICD-10-CM

## 2025-05-14 DIAGNOSIS — F41.1 GENERALIZED ANXIETY DISORDER: ICD-10-CM

## 2025-05-14 PROCEDURE — 4010F ACE/ARB THERAPY RXD/TAKEN: CPT | Mod: CPTII,,, | Performed by: NURSE PRACTITIONER

## 2025-05-14 PROCEDURE — 99214 OFFICE O/P EST MOD 30 MIN: CPT | Mod: ,,, | Performed by: NURSE PRACTITIONER

## 2025-05-14 PROCEDURE — 3008F BODY MASS INDEX DOCD: CPT | Mod: CPTII,,, | Performed by: NURSE PRACTITIONER

## 2025-05-14 PROCEDURE — 3074F SYST BP LT 130 MM HG: CPT | Mod: CPTII,,, | Performed by: NURSE PRACTITIONER

## 2025-05-14 PROCEDURE — 1159F MED LIST DOCD IN RCRD: CPT | Mod: CPTII,,, | Performed by: NURSE PRACTITIONER

## 2025-05-14 PROCEDURE — 1160F RVW MEDS BY RX/DR IN RCRD: CPT | Mod: CPTII,,, | Performed by: NURSE PRACTITIONER

## 2025-05-14 PROCEDURE — 3078F DIAST BP <80 MM HG: CPT | Mod: CPTII,,, | Performed by: NURSE PRACTITIONER

## 2025-05-14 RX ORDER — HYDROXYZINE HYDROCHLORIDE 50 MG/1
50 TABLET, FILM COATED ORAL 4 TIMES DAILY PRN
Qty: 120 TABLET | Refills: 2 | Status: SHIPPED | OUTPATIENT
Start: 2025-05-14

## 2025-05-14 RX ORDER — SERTRALINE HYDROCHLORIDE 100 MG/1
200 TABLET, FILM COATED ORAL DAILY
Qty: 60 TABLET | Refills: 2 | Status: SHIPPED | OUTPATIENT
Start: 2025-05-14

## 2025-05-14 RX ORDER — LURASIDONE HYDROCHLORIDE 80 MG/1
80 TABLET, FILM COATED ORAL DAILY
Qty: 30 TABLET | Refills: 2 | Status: SHIPPED | OUTPATIENT
Start: 2025-05-14

## 2025-05-14 NOTE — PROGRESS NOTES
"PSYCHIATRIC FOLLOW-UP VISIT NOTE    Chief Complaint   Patient presents with    Medication Management     Medication management         History of Present Illness  39 y.o. year old White female with hx of bipolar disorder, generalized anxiety disorder, and conversion disorder with seizures seen today for follow-up appointment and medication management.  Patient reports increased anxiety recently.  Has found it hard to focus.  Also reports frequent headaches.  I encouraged her to follow-up with her PCP or neurologist regarding these headaches but patient is resistant to doing so.  Has been fatigued recently as well due to these headaches.  She also states that today is a bad mental health day.  Had to repeat several questions to her to obtain appropriate answers.  She has presented to the clinic in similar conditions in the past.  This often occurred before seizure-like activity.  Patient states she does not feel that way today.  Overall she feels medications are effective for symptom control and she does not want any medication changes made today.  We will re-evaluate in 4 weeks.  If she presents in a similar condition I will recommend medication adjustments more strongly. Patient denies SI/HI. Denies hallucinations and does not appear to be responding to internal stimuli or be internally preoccupied. No manic symptoms noted.     Objective:     Vitals:  Vitals:    05/14/25 1104   BP: 126/78   BP Location: Right arm   Patient Position: Sitting   Pulse: 81   Temp: 98.8 °F (37.1 °C)   TempSrc: Temporal   SpO2: 100%   Weight: 135 kg (297 lb 9.9 oz)   Height: 4' 9.99" (1.473 m)       Wt Readings from Last 3 Encounters:   05/14/25 1104 135 kg (297 lb 9.9 oz)   05/06/25 1308 133.2 kg (293 lb 9.6 oz)   02/06/25 1049 135.6 kg (298 lb 15.1 oz)         Medication:  Current Medications[1]       Significant Labs: - none at this time    Significant Imaging: - none at this time    Physical Exam     See HPI    Review of Systems " "    Mental Status Exam:  Presentation:  - Appearance: 39 y.o. year old White female, appears stated age, appears Casually dressed and Well groomed  - Motility: Erect when standing, Steady gait, No EPS or Tremors, No psychomotor agitation or retardation appreciated  - Behavior: anxious, cooperative, doesn't maintain eye contact, restless   Speech:  - Character/Organization: spontaneous, deliberate, mild/mod stutter  Emotional State:  - Mood: "anxious"   - Affect: congruent and anxious  Thought:  - Process: logical, linear, organized , goal-directed  - Preoccupations: family  - Delusions: no persecutory, paranoid, or grandiose delusions appreciated  - Perception: denies AVH, not actively responding to internal stimuli  - SI/HI: denies/denies  Sensorium & Intellect:  - Sensorium: AAOx4  - Memory: intact to recent and remote events  - Attention/Concentration: good/good  - Insight/Judgement: fair and good/good    Gait: normal swing and stance  MSK:no rigidity appreciated    All other systems without acute issues unless noted in HPI      Assessment/Plan      ICD-10-CM ICD-9-CM    1. Bipolar I disorder  F31.9 296.7 lurasidone (LATUDA) 80 mg Tab tablet      2. Generalized anxiety disorder  F41.1 300.02 hydrOXYzine (ATARAX) 50 MG tablet      sertraline (ZOLOFT) 100 MG tablet      3. Conversion disorder with attacks or seizures  F44.5 300.11          Continue current medications without change    Potential side effects and risks vs benefits of current treatment plan reviewed with patient. Applicable black box warnings reviewed. Encouraged patient not to alter dosages or abruptly discontinue medications without contacting prescriber first, due to risk of worsening symptoms and decompensation of mental status. Warned of risks associated with herbal remedies and supplements while taking psychotropic medications and of the need to consult prescriber prior to adding any of these to current regimen. Patient should abstain from " abuse of alcohol, prescription medications, and illicit drugs. Reviewed when to contact clinic and/or seek emergent care, such as but not limited to, onset/worsening SI/HI, hallucinations, delusions, manic symptoms. Pt verbalized understanding and agreement of these warnings/recommendations and verbally consented to treatment plan.      Follow up in about 4 weeks (around 6/11/2025) for Medication Management.        Saad Combs, YUANP         [1]   Current Outpatient Medications:     acetaZOLAMIDE (DIAMOX) 250 MG tablet, Take 250 mg by mouth 3 (three) times daily. 4 tablets in am , 2 tablets in afternoon and 4 tablets at night, Disp: , Rfl:     cetirizine (ZYRTEC) 10 MG tablet, Take 10 mg by mouth once daily., Disp: , Rfl:     lamoTRIgine (LAMICTAL) 200 MG tablet, Take 1,000 mg by mouth 2 (two) times a day. 3 tablets(600mg) in am and 3(600mg) tablets in pm, Disp: , Rfl:     levonorgestreL (MIRENA) 20 mcg/24 hours (8 yrs) 52 mg IUD, 1 each by Intrauterine route once., Disp: , Rfl:     levothyroxine (SYNTHROID) 125 MCG tablet, TAKE 1 TABLET EVERY MORNING ON AN EMPTY STOMACH FOR THYROID, Disp: 90 tablet, Rfl: 1    losartan (COZAAR) 100 MG tablet, TAKE 1 TABLET DAILY FOR BLOOD PRESSURE, Disp: 90 tablet, Rfl: 3    pantoprazole (PROTONIX) 40 MG tablet, TAKE 1 TABLET DAILY, Disp: 90 tablet, Rfl: 1    phenytoin (DILANTIN) 100 MG ER capsule, Take 500 mg by mouth once daily. 300mg po am-200mg po hs c 50mg for total of 250mg po hs, Disp: , Rfl:     hydrOXYzine (ATARAX) 50 MG tablet, Take 1 tablet (50 mg total) by mouth 4 (four) times daily as needed for Itching., Disp: 120 tablet, Rfl: 2    lurasidone (LATUDA) 80 mg Tab tablet, Take 1 tablet (80 mg total) by mouth once daily., Disp: 30 tablet, Rfl: 2    phenytoin (DILANTIN) 50 mg chewable tablet, Take 50 mg by mouth every evening. (Patient not taking: Reported on 5/14/2025), Disp: , Rfl:     sertraline (ZOLOFT) 100 MG tablet, Take 2 tablets (200 mg total) by mouth  once daily., Disp: 60 tablet, Rfl: 2

## 2025-06-12 ENCOUNTER — OFFICE VISIT (OUTPATIENT)
Dept: BEHAVIORAL HEALTH | Facility: CLINIC | Age: 40
End: 2025-06-12
Payer: MEDICAID

## 2025-06-12 DIAGNOSIS — F44.5 CONVERSION DISORDER WITH ATTACKS OR SEIZURES: ICD-10-CM

## 2025-06-12 DIAGNOSIS — F31.9 BIPOLAR I DISORDER: Primary | ICD-10-CM

## 2025-06-12 DIAGNOSIS — F41.1 GENERALIZED ANXIETY DISORDER: ICD-10-CM

## 2025-06-12 RX ORDER — HYDROXYZINE HYDROCHLORIDE 50 MG/1
50 TABLET, FILM COATED ORAL 4 TIMES DAILY PRN
Qty: 120 TABLET | Refills: 1 | Status: SHIPPED | OUTPATIENT
Start: 2025-06-12

## 2025-06-12 RX ORDER — LURASIDONE HYDROCHLORIDE 80 MG/1
80 TABLET, FILM COATED ORAL DAILY
Qty: 30 TABLET | Refills: 1 | Status: SHIPPED | OUTPATIENT
Start: 2025-06-12

## 2025-06-12 RX ORDER — SERTRALINE HYDROCHLORIDE 100 MG/1
200 TABLET, FILM COATED ORAL DAILY
Qty: 60 TABLET | Refills: 1 | Status: SHIPPED | OUTPATIENT
Start: 2025-06-12

## 2025-06-12 NOTE — PROGRESS NOTES
The patient location is: [place]  The chief complaint leading to consultation is: medication management    Visit type: audiovisual    Face to Face time with patient: 7 minutes  Fifteen minutes of total time spent on the encounter, which includes face to face time and non-face to face time preparing to see the patient (eg, review of tests), Obtaining and/or reviewing separately obtained history, Documenting clinical information in the electronic or other health record, Independently interpreting results (not separately reported) and communicating results to the patient/family/caregiver, or Care coordination (not separately reported).     Each patient to whom he or she provides medical services by telemedicine is:  (1) informed of the relationship between the physician and patient and the respective role of any other health care provider with respect to management of the patient; and (2) notified that he or she may decline to receive medical services by telemedicine and may withdraw from such care at any time.    PSYCHIATRIC FOLLOW-UP VISIT NOTE    Chief Complaint   Patient presents with    Medication Management         History of Present Illness  39 y.o. year old White female with hx of bipolar disorder, generalized anxiety disorder, and conversion disorder seen today for follow-up appointment and medication management.  Patient reports that she has been doing quite well since her last visit.  Requested telehealth visit today due to having a new puppy and transportation issues due to weather.  She denies any recent depression and reports mood as happy most days of the week lately.  Anxiety has been very minimal.  She recently got a new puppy and reports this has been a very positive addition to her household.  States it helps keep her calm and that she is experiencing much less anxiety day-to-day.  Has been having some decreased sleep due to having a new puppy, but denies that this is a significant issue at  "present.  Overall she is without any acute complaints today. Patient denies SI/HI. Denies hallucinations and does not appear to be responding to internal stimuli or be internally preoccupied. No manic symptoms noted. Tolerating SGA therapy without serious side effects. No NMS s/s. No EPS or TD symptoms noted. AIMS=0.      Objective:     Vitals:  There were no vitals filed for this visit.    Wt Readings from Last 3 Encounters:   05/14/25 1104 135 kg (297 lb 9.9 oz)   05/06/25 1308 133.2 kg (293 lb 9.6 oz)   02/06/25 1049 135.6 kg (298 lb 15.1 oz)         Medication:  Current Medications[1]       Significant Labs: - none at this time    Significant Imaging: - none at this time    Physical Exam     See HPI    Review of Systems     Mental Status Exam:  Presentation:  - Appearance: 39 y.o. year old White female, appears stated age, appears Casually dressed and Well groomed  - Motility: Erect when standing, Steady gait, No EPS or Tremors, No psychomotor agitation or retardation appreciated  - Behavior: calm, cooperative, good eye contact  Speech:  - Character/Organization: spontaneous, fluent, normal volume, normal rate, normal rhythm  Emotional State:  - Mood: "happy"   - Affect: congruent and appropriate  Thought:  - Process: logical, linear, organized , goal-directed  - Preoccupations: no ruminations, rituals, or phobias appreciated  - Delusions: no persecutory, paranoid, or grandiose delusions appreciated  - Perception: denies AVH, not actively responding to internal stimuli  - SI/HI: denies/denies  Sensorium & Intellect:  - Sensorium: AAOx4  - Memory: intact to recent and remote events  - Attention/Concentration: good/good  - Insight/Judgement: good/good    Gait: normal swing and stance  MSK:no rigidity appreciated    All other systems without acute issues unless noted in HPI      Assessment/Plan      ICD-10-CM ICD-9-CM    1. Bipolar I disorder  F31.9 296.7       2. Generalized anxiety disorder  F41.1 300.02       3. " Conversion disorder with attacks or seizures  F44.5 300.11          Continue current medications without change    Potential side effects and risks vs benefits of current treatment plan reviewed with patient. Applicable black box warnings reviewed. Encouraged patient not to alter dosages or abruptly discontinue medications without contacting prescriber first, due to risk of worsening symptoms and decompensation of mental status. Warned of risks associated with herbal remedies and supplements while taking psychotropic medications and of the need to consult prescriber prior to adding any of these to current regimen. Patient should abstain from abuse of alcohol, prescription medications, and illicit drugs. Reviewed when to contact clinic and/or seek emergent care, such as but not limited to, onset/worsening SI/HI, hallucinations, delusions, manic symptoms. Pt verbalized understanding and agreement of these warnings/recommendations and verbally consented to treatment plan.      Follow up in about 2 months (around 8/12/2025) for Medication Management.        Saad Combs, PMHNP                               [1]   Current Outpatient Medications:     acetaZOLAMIDE (DIAMOX) 250 MG tablet, Take 250 mg by mouth 3 (three) times daily. 4 tablets in am , 2 tablets in afternoon and 4 tablets at night, Disp: , Rfl:     cetirizine (ZYRTEC) 10 MG tablet, Take 10 mg by mouth once daily., Disp: , Rfl:     hydrOXYzine (ATARAX) 50 MG tablet, Take 1 tablet (50 mg total) by mouth 4 (four) times daily as needed for Itching., Disp: 120 tablet, Rfl: 2    lamoTRIgine (LAMICTAL) 200 MG tablet, Take 1,000 mg by mouth 2 (two) times a day. 3 tablets(600mg) in am and 3(600mg) tablets in pm, Disp: , Rfl:     levonorgestreL (MIRENA) 20 mcg/24 hours (8 yrs) 52 mg IUD, 1 each by Intrauterine route once., Disp: , Rfl:     levothyroxine (SYNTHROID) 125 MCG tablet, TAKE 1 TABLET EVERY MORNING ON AN EMPTY STOMACH FOR THYROID, Disp: 90 tablet,  Rfl: 1    losartan (COZAAR) 100 MG tablet, TAKE 1 TABLET DAILY FOR BLOOD PRESSURE, Disp: 90 tablet, Rfl: 3    lurasidone (LATUDA) 80 mg Tab tablet, Take 1 tablet (80 mg total) by mouth once daily., Disp: 30 tablet, Rfl: 2    pantoprazole (PROTONIX) 40 MG tablet, TAKE 1 TABLET DAILY, Disp: 90 tablet, Rfl: 1    phenytoin (DILANTIN) 100 MG ER capsule, Take 500 mg by mouth once daily. 300mg po am-200mg po hs c 50mg for total of 250mg po hs, Disp: , Rfl:     sertraline (ZOLOFT) 100 MG tablet, Take 2 tablets (200 mg total) by mouth once daily., Disp: 60 tablet, Rfl: 2

## 2025-07-28 PROCEDURE — 85025 COMPLETE CBC W/AUTO DIFF WBC: CPT | Performed by: NURSE PRACTITIONER

## 2025-07-28 PROCEDURE — 84443 ASSAY THYROID STIM HORMONE: CPT | Performed by: NURSE PRACTITIONER

## 2025-07-28 PROCEDURE — 80053 COMPREHEN METABOLIC PANEL: CPT | Performed by: NURSE PRACTITIONER

## 2025-07-28 PROCEDURE — 83036 HEMOGLOBIN GLYCOSYLATED A1C: CPT | Performed by: NURSE PRACTITIONER

## 2025-07-28 PROCEDURE — 80061 LIPID PANEL: CPT | Performed by: NURSE PRACTITIONER

## 2025-07-28 PROCEDURE — 82306 VITAMIN D 25 HYDROXY: CPT | Performed by: NURSE PRACTITIONER

## 2025-08-04 ENCOUNTER — OFFICE VISIT (OUTPATIENT)
Dept: FAMILY MEDICINE | Facility: CLINIC | Age: 40
End: 2025-08-04
Payer: MEDICAID

## 2025-08-04 VITALS
WEIGHT: 293 LBS | SYSTOLIC BLOOD PRESSURE: 132 MMHG | TEMPERATURE: 98 F | OXYGEN SATURATION: 98 % | HEART RATE: 83 BPM | BODY MASS INDEX: 61.5 KG/M2 | HEIGHT: 58 IN | DIASTOLIC BLOOD PRESSURE: 72 MMHG

## 2025-08-04 DIAGNOSIS — E66.813 CLASS 3 SEVERE OBESITY WITH SERIOUS COMORBIDITY AND BODY MASS INDEX (BMI) OF 60.0 TO 69.9 IN ADULT: ICD-10-CM

## 2025-08-04 DIAGNOSIS — I10 PRIMARY HYPERTENSION: ICD-10-CM

## 2025-08-04 DIAGNOSIS — G47.33 OSA (OBSTRUCTIVE SLEEP APNEA): ICD-10-CM

## 2025-08-04 DIAGNOSIS — G43.909 MIGRAINE WITHOUT STATUS MIGRAINOSUS, NOT INTRACTABLE, UNSPECIFIED MIGRAINE TYPE: ICD-10-CM

## 2025-08-04 DIAGNOSIS — E55.9 VITAMIN D DEFICIENCY: ICD-10-CM

## 2025-08-04 DIAGNOSIS — E03.9 ACQUIRED HYPOTHYROIDISM: ICD-10-CM

## 2025-08-04 DIAGNOSIS — E78.5 HYPERLIPIDEMIA, UNSPECIFIED HYPERLIPIDEMIA TYPE: ICD-10-CM

## 2025-08-04 DIAGNOSIS — G93.2 BENIGN INTRACRANIAL HYPERTENSION: ICD-10-CM

## 2025-08-04 DIAGNOSIS — K21.9 GASTROESOPHAGEAL REFLUX DISEASE, UNSPECIFIED WHETHER ESOPHAGITIS PRESENT: ICD-10-CM

## 2025-08-04 DIAGNOSIS — Z00.00 WELLNESS EXAMINATION: Primary | ICD-10-CM

## 2025-08-04 DIAGNOSIS — Z79.899 OTHER LONG TERM (CURRENT) DRUG THERAPY: ICD-10-CM

## 2025-08-04 DIAGNOSIS — G40.909 NONINTRACTABLE EPILEPSY WITHOUT STATUS EPILEPTICUS, UNSPECIFIED EPILEPSY TYPE: ICD-10-CM

## 2025-08-04 DIAGNOSIS — K27.9 PUD (PEPTIC ULCER DISEASE): ICD-10-CM

## 2025-08-04 PROCEDURE — 99395 PREV VISIT EST AGE 18-39: CPT | Mod: ,,, | Performed by: NURSE PRACTITIONER

## 2025-08-04 PROCEDURE — 4010F ACE/ARB THERAPY RXD/TAKEN: CPT | Mod: CPTII,,, | Performed by: NURSE PRACTITIONER

## 2025-08-04 PROCEDURE — 3078F DIAST BP <80 MM HG: CPT | Mod: CPTII,,, | Performed by: NURSE PRACTITIONER

## 2025-08-04 PROCEDURE — 1160F RVW MEDS BY RX/DR IN RCRD: CPT | Mod: CPTII,,, | Performed by: NURSE PRACTITIONER

## 2025-08-04 PROCEDURE — 3075F SYST BP GE 130 - 139MM HG: CPT | Mod: CPTII,,, | Performed by: NURSE PRACTITIONER

## 2025-08-04 PROCEDURE — 3044F HG A1C LEVEL LT 7.0%: CPT | Mod: CPTII,,, | Performed by: NURSE PRACTITIONER

## 2025-08-04 PROCEDURE — 1159F MED LIST DOCD IN RCRD: CPT | Mod: CPTII,,, | Performed by: NURSE PRACTITIONER

## 2025-08-04 PROCEDURE — 3008F BODY MASS INDEX DOCD: CPT | Mod: CPTII,,, | Performed by: NURSE PRACTITIONER

## 2025-08-04 RX ORDER — VIT C/E/ZN/COPPR/LUTEIN/ZEAXAN 250MG-90MG
5000 CAPSULE ORAL DAILY
Qty: 90 CAPSULE | Refills: 3 | Status: SHIPPED | OUTPATIENT
Start: 2025-08-04

## 2025-08-04 NOTE — PROGRESS NOTES
Patient ID: Ebony Richard  : 1985    Chief Complaint: Annual Exam    Allergies: Patient is allergic to codeine.     History of Present Illness:  The patient is a 39 y.o. White female who presents to clinic for annual wellness visit.    History of Present Illness    CHIEF COMPLAINT:  Patient presents today for a wellness visit.    SEIZURES:  Her most recent seizure occurred 2-3 days ago during sleep, which woke up her friend. The seizure did not last long. She currently has no active neurologist, as her previous neurologist Dr. Alaniz has retired. She previously declined a referral to a neurologist in [place] due to concerns about burdening her mother with transportation. She believes her CPAP may have been helpful during the nocturnal seizure by ensuring oxygen delivery and is interested in establishing care with a new neurologist.    HEADACHES:  She reports significant improvement in headaches after increasing Diamox dosage from 5 to 8 pills daily. She currently experiences only sinus headaches, which are markedly reduced compared to previous severity. Her headaches have improved approximately 10-fold since medication adjustment. Diamox dosage was gradually increased under medical guidance, with current stable dosage at 8 pills daily.    ANXIETY:  She experienced an anxiety attack during a recent blood draw, which resulted in emotional distress and crying. She describes difficulty maintaining composure during the blood draw procedure. When her anxiety becomes severe, she experiences vomiting as a physiological response.    DIET AND WEIGHT:  She acknowledges poor dietary habits over the past 2-3 months contributing to weight gain and elevated cholesterol. She reports not cooking during this period and primarily consuming pre-made chicken strips. She initiated cooking again approximately one week ago but ran out of food as of yesterday. She demonstrates insight into the impact of her recent dietary  choices on her overall health and expresses a willingness to improve her nutritional intake.    SLEEP:  She continues to use CPAP machine during sleep with no reported issues with CPAP supplies or machine functionality.    CURRENT MEDICATIONS:  She is currently taking heartburn medication and denies taking Vitamin D supplement.      ROS:  ROS as indicated in HPI.          Past Medical History:  has a past medical history of Anxiety, Bipolar disorder, Epilepsy, unspecified, not intractable, with status epilepticus, GERD (gastroesophageal reflux disease), Hematochezia, Hyperlipidemia, Hypertension, Hypothyroidism, Intracranial hypertension, Long term use of drug, Loose stools, Migraines, Obesity, unspecified, and Schizoaffective disorder.    Surgical History:  has a past surgical history that includes Intrauterine device insertion (05/25/2022); Cholecystectomy; Tonsillectomy; and esophagesophagostomy.    Family History: family history includes Autoimmune disease in her mother; Bipolar disorder in her cousin; Colon cancer in her maternal aunt and maternal aunt; Depression in her brother; Diabetes type II in her father and mother; Hypertension in her father and mother; No Known Problems in her maternal grandmother, maternal uncle, paternal aunt, paternal grandfather, paternal grandmother, paternal uncle, sister, and another family member; Schizophrenia in her maternal grandfather.    Social History:  reports that she has been smoking cigarettes. She started smoking about 7 years ago. She has a 3.9 pack-year smoking history. She has been exposed to tobacco smoke. She has never used smokeless tobacco. She reports that she does not drink alcohol and does not use drugs.    Care Team: Patient Care Team:  Evelyn Chun FNP-C as PCP - General (Family Medicine)  Benji Alaniz MD (Neurology)  Saad Combs PMHNP as Nurse Practitioner (Psychiatry)  Brenda Heart NP as Nurse Practitioner  "(Obstetrics and Gynecology)     Current Medications:  Current Outpatient Medications   Medication Instructions    acetaZOLAMIDE (DIAMOX) 250 mg, 3 times daily    cetirizine (ZYRTEC) 10 mg, Daily    cholecalciferol (vitamin D3) 5,000 Units, Oral, Daily    hydrOXYzine (ATARAX) 50 mg, Oral, 4 times daily PRN    lamoTRIgine (LAMICTAL) 1,000 mg, 2 times daily    levonorgestreL (MIRENA) 20 mcg/24 hours (8 yrs) 52 mg IUD 1 each, Once    levothyroxine (SYNTHROID) 125 MCG tablet TAKE 1 TABLET EVERY MORNING ON AN EMPTY STOMACH FOR THYROID    losartan (COZAAR) 100 mg, Oral, FOR BLOOD PRESSURE    lurasidone (LATUDA) 80 mg, Oral, Daily    pantoprazole (PROTONIX) 40 mg, Oral    phenytoin (DILANTIN) 500 mg, Daily    sertraline (ZOLOFT) 200 mg, Oral, Daily       Review of Systems     Visit Vitals  /72 (BP Location: Right arm, Patient Position: Sitting)   Pulse 83   Temp 98.2 °F (36.8 °C) (Temporal)   Ht 4' 9.99" (1.473 m)   Wt (!) 136.8 kg (301 lb 9.6 oz)   SpO2 98%   BMI 63.05 kg/m²       Physical Exam  Vitals reviewed.   Constitutional:       General: She is not in acute distress.     Appearance: She is morbidly obese. She is not ill-appearing.   HENT:      Head: Normocephalic and atraumatic.   Cardiovascular:      Rate and Rhythm: Normal rate and regular rhythm.      Heart sounds: Normal heart sounds.   Pulmonary:      Effort: Pulmonary effort is normal.      Breath sounds: Normal breath sounds.   Musculoskeletal:      Right lower leg: No edema.      Left lower leg: No edema.   Lymphadenopathy:      Cervical: No cervical adenopathy.   Skin:     General: Skin is warm and dry.   Neurological:      Mental Status: She is alert and oriented to person, place, and time.   Psychiatric:         Mood and Affect: Mood normal.         Behavior: Behavior normal.          Labs Reviewed:  Chemistry:  Lab Results   Component Value Date     07/28/2025    K 3.9 07/28/2025    BUN 18 07/28/2025    CREATININE 0.81 07/28/2025    " EGFRNORACEVR >90 07/28/2025    CALCIUM 8.7 07/28/2025    ALKPHOS 128 07/28/2025    ALBUMIN 3.9 07/28/2025    AST 13 07/28/2025    ALT 13 07/28/2025    [id number] 27 (L) 07/28/2025    TSH 2.518 07/28/2025        Lab Results   Component Value Date    HGBA1C 5.4 07/28/2025        Hematology:  Lab Results   Component Value Date    WBC 11.63 (H) 07/28/2025    RBC 4.80 07/28/2025    HGB 14.0 07/28/2025    HCT 44.1 07/28/2025    MCV 91.9 07/28/2025    MCH 29.2 07/28/2025    MCHC 31.7 07/28/2025    RDW 14.0 07/28/2025     (H) 07/28/2025    MPV 9.0 (L) 07/28/2025       Lipid Panel:  Lab Results   Component Value Date    CHOL 183 07/28/2025    HDL 38 07/28/2025    LDLDIRECT 95.8 07/16/2024    LDLCALC 105.00 07/28/2025    TRIG 202 (H) 07/28/2025    TOTALCHOLEST 5 07/28/2025         Assessment & Plan:  1. Wellness examination  Overview:  Health Maintenance   Topic Date Due    Pneumococcal Vaccines (Age 0-49) (1 of 2 - PCV) Never done    TETANUS VACCINE  06/22/2020    COVID-19 Vaccine (4 - 2024-25 season) 09/01/2024    Influenza Vaccine (1) 09/01/2025    Cervical Cancer Screening  05/06/2026    Hemoglobin A1c (Diabetic Prevention Screening)  07/28/2028    RSV Vaccine (Age 60+ and Pregnant patients) (1 - 1-dose 75+ series) 10/18/2060    Hepatitis C Screening  Completed    HIV Screening  Completed    Lipid Panel  Completed           2. Acquired hypothyroidism  Overview:  On levothyroxine 125 mcg      3. Primary hypertension  Overview:  On losartan.      4. Class 3 severe obesity with serious comorbidity and body mass index (BMI) of 60.0 to 69.9 in adult    5. Migraine without status migrainosus, not intractable, unspecified migraine type  Overview:  At dose of diamox 250 mg (8 tabs daily) migraines are gone.       6. Benign intracranial hypertension  Overview:  On diamox but temorary solution. Was established with Dr. Alaniz but he recently retired.  He had been considering shunt placement.      Orders:  -      Ambulatory referral/consult to Neurology; Future; Expected date: 08/11/2025    7. Nonintractable epilepsy without status epilepticus, unspecified epilepsy type  Overview:  On lamotrigine 1000 mg bid, dilantin 500 mg daily    Orders:  -     Ambulatory referral/consult to Neurology; Future; Expected date: 08/11/2025    8. Hyperlipidemia, unspecified hyperlipidemia type    9. Vitamin D deficiency  -     cholecalciferol, vitamin D3, 125 mcg (5,000 unit) capsule; Take 1 capsule (5,000 Units total) by mouth once daily.  Dispense: 90 capsule; Refill: 3    10. Other long term (current) drug therapy  -     CBC Auto Differential; Future; Expected date: 02/04/2026  -     Comprehensive Metabolic Panel; Future; Expected date: 02/04/2026  -     TSH; Future; Expected date: 02/04/2026  -     Hemoglobin A1C; Future; Expected date: 02/04/2026  -     Vitamin D; Future; Expected date: 02/04/2026  -     T4, Free; Future; Expected date: 02/04/2026  -     T3, Free; Future; Expected date: 02/04/2026  -     Iron and TIBC; Future; Expected date: 02/04/2026  -     Ferritin; Future; Expected date: 02/04/2026  -     Vitamin B12; Future; Expected date: 02/04/2026  -     Folate; Future; Expected date: 02/04/2026    11. RAHEL (obstructive sleep apnea)  Overview:  Compliant with CPAP      12. Gastroesophageal reflux disease, unspecified whether esophagitis present    13. PUD (peptic ulcer disease)  Overview:  First diagnosed when she was in high school by EGD.  Last EGD 2024 WNL.           Assessment & Plan    G40.909 Nonintractable epilepsy without status epilepticus, unspecified epilepsy type  Z00.00 Wellness exam  E03.9 Acquired hypothyroidism  I10 Primary hypertension  E66.813, Z68.44 Class 3 severe obesity with serious comorbidity and body mass index (BMI) of 60.0 to 69.9 in adult  G43.909 Migraine without status migrainosus, not intractable, unspecified migraine type  G93.2 Benign intracranial hypertension  E78.5 Hyperlipidemia, unspecified  hyperlipidemia type  E55.9 Vitamin D deficiency  Z79.899 Other long term (current) drug therapy  G47.33 RAHEL (obstructive sleep apnea)  K21.9 Gastroesophageal reflux disease, unspecified whether esophagitis present  K27.9 PUD (peptic ulcer disease)    IMPRESSION:  - Assessed seizure activity, noting recent episode 2-3 days ago during sleep.  - Evaluated headache improvement with increased Diamox dosage.  - Considered vitamin D supplementation due to low levels.  - Noted recent dietary changes and weight gain.  - Assessed for any acute illnesses or urinary symptoms.  - Reviewed CPAP usage and its potential benefit during nocturnal seizures.  - Considered previous EGD results.    G40.909 NONINTRACTABLE EPILEPSY WITHOUT STATUS EPILEPTICUS, UNSPECIFIED EPILEPSY TYPE:  - Referred to a new neurologist (previous neurologist retired).  - Contact the office if no call received within 2 weeks to schedule neurologist appointment.  - Contact the office for medication refills if neurologist appointment is scheduled far in the future and current prescriptions run out.    Z00.00 WELLNESS EXAM:  - Ordered labs for 6 months from now.    G43.909 MIGRAINE WITHOUT STATUS MIGRAINOSUS, NOT INTRACTABLE, UNSPECIFIED MIGRAINE TYPE:  - Management to be addressed by new neurologist referral (see epilepsy plan).    G93.2 BENIGN INTRACRANIAL HYPERTENSION:  - Continued Diamox at current dose of 8 tablets daily.  - Management to be addressed by new neurologist referral (see epilepsy plan).    E55.9 VITAMIN D DEFICIENCY:  - Started Vitamin D supplement: 1 tablet daily with follow up in 6 months to recheck level.    Z79.899 OTHER LONG TERM (CURRENT) DRUG THERAPY:  - Current medications include Diamox for benign intracranial hypertension and heartburn medication for GERD/PUD.    K21.9 GASTROESOPHAGEAL REFLUX DISEASE, UNSPECIFIED WHETHER ESOPHAGITIS PRESENT:  - Continued heartburn medication.    K27.9 PUD (PEPTIC ULCER DISEASE):  - Continued heartburn  medication as part of GERD management.          Vaccinations:  Immunization History   Administered Date(s) Administered    COVID-19 MRNA, LN-S PF (MODERNA HALF 0.25 ML DOSE) 12/28/2021    COVID-19, MRNA, LN-S, PF (MODERNA FULL 0.5 ML DOSE) 03/29/2021, 04/26/2021    DTP 07/28/1986, 12/22/1986, 04/06/1987, 04/14/1988, 03/25/1991    HPV 9-Valent 06/22/2010, 11/01/2010, 01/31/2012    HPV Quadrivalent 06/22/2010, 11/01/2010, 01/31/2012    Hepatitis B, Pediatric/Adolescent 10/28/1997, 12/29/1997, 07/13/1998    Influenza - Quadrivalent - MDCK - PF 12/12/2017    Influenza - Quadrivalent - PF *Preferred* (6 months and older) 11/22/2022, 12/15/2023    Influenza - Trivalent - Fluarix, Flulaval, Fluzone, Afluria - PF 11/01/2010, 12/10/2015, 11/21/2016, 10/12/2021    MMR 12/22/1986, 03/25/1991    OPV 07/28/1986, 12/22/1986, 04/14/1988, 03/25/1991    Tdap 06/22/2010       Future Appointments   Date Time Provider Department Center   8/12/2025 11:00 AM Saad Combs, PMHNP [facility]   1/28/2026 11:00 AM LAB, [facility] LABORATORY DRAW STATION [facility]   2/4/2026 11:00 AM [facility]   5/11/2026  1:30 PM Brenda Heart, NP [facility]   7/29/2026 10:30 AM LAB, [facility] LABORATORY DRAW STATION [facility]   8/11/2026 11:00 AM Evelyn Chun FNP-C [facility]       Follow up for 1), 6 mo, Routine, non-fasting labs prior, 2), 1 yr, Wellness, fasting labs prior. Call sooner if needed.    This note was generated with the assistance of ambient listening technology. Verbal consent was obtained by the patient and accompanying visitor(s) for the recording of patient appointment to facilitate this note. I attest to having reviewed and edited the generated note for accuracy, though some syntax or spelling errors may persist. Please contact the author of this note for any clarification.

## 2025-08-11 ENCOUNTER — TELEPHONE (OUTPATIENT)
Dept: FAMILY MEDICINE | Facility: CLINIC | Age: 40
End: 2025-08-11
Payer: MEDICAID

## 2025-08-12 ENCOUNTER — OFFICE VISIT (OUTPATIENT)
Dept: BEHAVIORAL HEALTH | Facility: CLINIC | Age: 40
End: 2025-08-12
Payer: MEDICAID

## 2025-08-12 VITALS — BODY MASS INDEX: 61.5 KG/M2 | WEIGHT: 293 LBS | HEIGHT: 58 IN

## 2025-08-12 DIAGNOSIS — F31.9 BIPOLAR I DISORDER: Primary | ICD-10-CM

## 2025-08-12 DIAGNOSIS — F44.5 CONVERSION DISORDER WITH ATTACKS OR SEIZURES: ICD-10-CM

## 2025-08-12 DIAGNOSIS — F41.1 GENERALIZED ANXIETY DISORDER: ICD-10-CM

## 2025-08-12 DIAGNOSIS — F43.21 GRIEF: ICD-10-CM

## 2025-08-12 PROCEDURE — 4010F ACE/ARB THERAPY RXD/TAKEN: CPT | Mod: CPTII,95,, | Performed by: NURSE PRACTITIONER

## 2025-08-12 PROCEDURE — 98005 SYNCH AUDIO-VIDEO EST LOW 20: CPT | Mod: 95,,, | Performed by: NURSE PRACTITIONER

## 2025-08-12 PROCEDURE — 1159F MED LIST DOCD IN RCRD: CPT | Mod: CPTII,95,, | Performed by: NURSE PRACTITIONER

## 2025-08-12 PROCEDURE — 1160F RVW MEDS BY RX/DR IN RCRD: CPT | Mod: CPTII,95,, | Performed by: NURSE PRACTITIONER

## 2025-08-12 PROCEDURE — 3044F HG A1C LEVEL LT 7.0%: CPT | Mod: CPTII,95,, | Performed by: NURSE PRACTITIONER

## 2025-08-12 RX ORDER — HYDROXYZINE HYDROCHLORIDE 50 MG/1
50 TABLET, FILM COATED ORAL 4 TIMES DAILY PRN
Qty: 120 TABLET | Refills: 1 | Status: SHIPPED | OUTPATIENT
Start: 2025-08-12

## 2025-08-12 RX ORDER — SERTRALINE HYDROCHLORIDE 100 MG/1
200 TABLET, FILM COATED ORAL DAILY
Qty: 60 TABLET | Refills: 1 | Status: SHIPPED | OUTPATIENT
Start: 2025-08-12

## 2025-08-12 RX ORDER — LURASIDONE HYDROCHLORIDE 80 MG/1
80 TABLET, FILM COATED ORAL DAILY
Qty: 30 TABLET | Refills: 1 | Status: SHIPPED | OUTPATIENT
Start: 2025-08-12

## 2025-08-18 ENCOUNTER — OFFICE VISIT (OUTPATIENT)
Dept: NEUROLOGY | Facility: CLINIC | Age: 40
End: 2025-08-18
Payer: MEDICAID

## 2025-08-18 VITALS
HEART RATE: 78 BPM | DIASTOLIC BLOOD PRESSURE: 84 MMHG | BODY MASS INDEX: 63.21 KG/M2 | WEIGHT: 293 LBS | HEIGHT: 57 IN | OXYGEN SATURATION: 98 % | SYSTOLIC BLOOD PRESSURE: 121 MMHG

## 2025-08-18 DIAGNOSIS — F41.9 ANXIETY DISORDER, UNSPECIFIED TYPE: ICD-10-CM

## 2025-08-18 DIAGNOSIS — F44.5 FUNCTIONAL NEUROLOGICAL SYMPTOM DISORDER WITH ATTACKS OR SEIZURES: Primary | ICD-10-CM

## 2025-08-18 DIAGNOSIS — G93.2 IIH (IDIOPATHIC INTRACRANIAL HYPERTENSION): ICD-10-CM

## 2025-08-18 PROCEDURE — 4010F ACE/ARB THERAPY RXD/TAKEN: CPT | Mod: CPTII,,, | Performed by: PSYCHIATRY & NEUROLOGY

## 2025-08-18 PROCEDURE — 3074F SYST BP LT 130 MM HG: CPT | Mod: CPTII,,, | Performed by: PSYCHIATRY & NEUROLOGY

## 2025-08-18 PROCEDURE — 3008F BODY MASS INDEX DOCD: CPT | Mod: CPTII,,, | Performed by: PSYCHIATRY & NEUROLOGY

## 2025-08-18 PROCEDURE — 99214 OFFICE O/P EST MOD 30 MIN: CPT | Mod: PBBFAC | Performed by: PSYCHIATRY & NEUROLOGY

## 2025-08-18 PROCEDURE — 1159F MED LIST DOCD IN RCRD: CPT | Mod: CPTII,,, | Performed by: PSYCHIATRY & NEUROLOGY

## 2025-08-18 PROCEDURE — G2211 COMPLEX E/M VISIT ADD ON: HCPCS | Mod: ,,, | Performed by: PSYCHIATRY & NEUROLOGY

## 2025-08-18 PROCEDURE — 99205 OFFICE O/P NEW HI 60 MIN: CPT | Mod: S$PBB,,, | Performed by: PSYCHIATRY & NEUROLOGY

## 2025-08-18 PROCEDURE — 3044F HG A1C LEVEL LT 7.0%: CPT | Mod: CPTII,,, | Performed by: PSYCHIATRY & NEUROLOGY

## 2025-08-18 PROCEDURE — 3079F DIAST BP 80-89 MM HG: CPT | Mod: CPTII,,, | Performed by: PSYCHIATRY & NEUROLOGY

## 2025-08-18 RX ORDER — ACETAZOLAMIDE 500 MG/1
1000 CAPSULE, EXTENDED RELEASE ORAL 2 TIMES DAILY
Qty: 120 CAPSULE | Refills: 4 | Status: SHIPPED | OUTPATIENT
Start: 2025-08-18 | End: 2026-01-15

## 2025-08-18 RX ORDER — ASPIRIN 81 MG/1
81 TABLET ORAL DAILY
COMMUNITY

## 2025-08-18 RX ORDER — LAMOTRIGINE 200 MG/1
600 TABLET ORAL 2 TIMES DAILY
Qty: 180 TABLET | Refills: 3 | Status: SHIPPED | OUTPATIENT
Start: 2025-08-18 | End: 2025-12-16

## 2025-08-18 RX ORDER — PHENYTOIN SODIUM 100 MG/1
CAPSULE, EXTENDED RELEASE ORAL
Qty: 210 CAPSULE | Refills: 0 | Status: SHIPPED | OUTPATIENT
Start: 2025-08-18 | End: 2025-11-16